# Patient Record
Sex: MALE | Race: WHITE | Employment: FULL TIME | URBAN - METROPOLITAN AREA
[De-identification: names, ages, dates, MRNs, and addresses within clinical notes are randomized per-mention and may not be internally consistent; named-entity substitution may affect disease eponyms.]

---

## 2017-02-21 ENCOUNTER — OFFICE VISIT (OUTPATIENT)
Dept: FAMILY MEDICINE CLINIC | Age: 67
End: 2017-02-21

## 2017-02-21 VITALS
BODY MASS INDEX: 29.51 KG/M2 | WEIGHT: 183.6 LBS | SYSTOLIC BLOOD PRESSURE: 130 MMHG | HEART RATE: 72 BPM | DIASTOLIC BLOOD PRESSURE: 82 MMHG | HEIGHT: 66 IN

## 2017-02-21 DIAGNOSIS — F41.9 ANXIETY: ICD-10-CM

## 2017-02-21 DIAGNOSIS — I10 ESSENTIAL HYPERTENSION: ICD-10-CM

## 2017-02-21 DIAGNOSIS — E11.9 TYPE 2 DIABETES MELLITUS WITHOUT COMPLICATION, UNSPECIFIED LONG TERM INSULIN USE STATUS: Primary | Chronic | ICD-10-CM

## 2017-02-21 DIAGNOSIS — E78.5 HYPERLIPIDEMIA, UNSPECIFIED HYPERLIPIDEMIA TYPE: ICD-10-CM

## 2017-02-21 LAB
CREATININE URINE: 77.8 MG/DL (ref 39–259)
HBA1C MFR BLD: 13.3 %
MICROALBUMIN UR-MCNC: <1.2 MG/DL
MICROALBUMIN/CREAT UR-RTO: NORMAL MG/G (ref 0–30)

## 2017-02-21 PROCEDURE — 83036 HEMOGLOBIN GLYCOSYLATED A1C: CPT | Performed by: FAMILY MEDICINE

## 2017-02-21 PROCEDURE — 99213 OFFICE O/P EST LOW 20 MIN: CPT | Performed by: FAMILY MEDICINE

## 2017-02-21 RX ORDER — ROSUVASTATIN CALCIUM 20 MG/1
20 TABLET, COATED ORAL DAILY
Qty: 30 TABLET | Refills: 2 | Status: SHIPPED | OUTPATIENT
Start: 2017-02-21 | End: 2017-03-14 | Stop reason: SDUPTHER

## 2017-02-21 RX ORDER — CITALOPRAM 40 MG/1
40 TABLET ORAL DAILY
Qty: 30 TABLET | Refills: 0 | Status: SHIPPED | OUTPATIENT
Start: 2017-02-21 | End: 2017-06-08 | Stop reason: SDUPTHER

## 2017-02-21 RX ORDER — LOSARTAN POTASSIUM 25 MG/1
25 TABLET ORAL NIGHTLY
Qty: 30 TABLET | Refills: 2 | Status: SHIPPED | OUTPATIENT
Start: 2017-02-21 | End: 2017-03-14 | Stop reason: SDUPTHER

## 2017-02-21 ASSESSMENT — PATIENT HEALTH QUESTIONNAIRE - PHQ9
1. LITTLE INTEREST OR PLEASURE IN DOING THINGS: 0
2. FEELING DOWN, DEPRESSED OR HOPELESS: 0
SUM OF ALL RESPONSES TO PHQ9 QUESTIONS 1 & 2: 0
SUM OF ALL RESPONSES TO PHQ QUESTIONS 1-9: 0

## 2017-02-28 ENCOUNTER — OFFICE VISIT (OUTPATIENT)
Dept: FAMILY MEDICINE CLINIC | Age: 67
End: 2017-02-28

## 2017-02-28 VITALS
HEIGHT: 66 IN | WEIGHT: 186.4 LBS | DIASTOLIC BLOOD PRESSURE: 64 MMHG | SYSTOLIC BLOOD PRESSURE: 118 MMHG | BODY MASS INDEX: 29.96 KG/M2 | HEART RATE: 74 BPM

## 2017-02-28 DIAGNOSIS — E11.649 HYPOGLYCEMIA DUE TO TYPE 2 DIABETES MELLITUS (HCC): Primary | ICD-10-CM

## 2017-02-28 PROCEDURE — 99213 OFFICE O/P EST LOW 20 MIN: CPT | Performed by: FAMILY MEDICINE

## 2017-03-14 ENCOUNTER — OFFICE VISIT (OUTPATIENT)
Dept: FAMILY MEDICINE CLINIC | Age: 67
End: 2017-03-14

## 2017-03-14 VITALS
HEART RATE: 65 BPM | WEIGHT: 193.4 LBS | DIASTOLIC BLOOD PRESSURE: 62 MMHG | HEIGHT: 66 IN | SYSTOLIC BLOOD PRESSURE: 110 MMHG | BODY MASS INDEX: 31.08 KG/M2

## 2017-03-14 DIAGNOSIS — E78.5 HYPERLIPIDEMIA, UNSPECIFIED HYPERLIPIDEMIA TYPE: ICD-10-CM

## 2017-03-14 DIAGNOSIS — I10 ESSENTIAL HYPERTENSION: ICD-10-CM

## 2017-03-14 DIAGNOSIS — E11.9 TYPE 2 DIABETES MELLITUS WITHOUT COMPLICATION, UNSPECIFIED LONG TERM INSULIN USE STATUS: Chronic | ICD-10-CM

## 2017-03-14 PROCEDURE — 99213 OFFICE O/P EST LOW 20 MIN: CPT | Performed by: FAMILY MEDICINE

## 2017-03-14 RX ORDER — ROSUVASTATIN CALCIUM 20 MG/1
20 TABLET, COATED ORAL DAILY
Qty: 30 TABLET | Refills: 2 | Status: SHIPPED | OUTPATIENT
Start: 2017-03-14 | End: 2017-06-14 | Stop reason: SDUPTHER

## 2017-03-14 RX ORDER — LOSARTAN POTASSIUM 25 MG/1
25 TABLET ORAL NIGHTLY
Qty: 30 TABLET | Refills: 2 | Status: SHIPPED | OUTPATIENT
Start: 2017-03-14 | End: 2017-06-14 | Stop reason: SDUPTHER

## 2017-03-22 ENCOUNTER — TELEPHONE (OUTPATIENT)
Dept: FAMILY MEDICINE CLINIC | Age: 67
End: 2017-03-22

## 2017-06-08 ENCOUNTER — TELEPHONE (OUTPATIENT)
Dept: FAMILY MEDICINE CLINIC | Age: 67
End: 2017-06-08

## 2017-06-08 DIAGNOSIS — F41.9 ANXIETY: ICD-10-CM

## 2017-06-08 RX ORDER — CITALOPRAM 40 MG/1
40 TABLET ORAL DAILY
Qty: 30 TABLET | Refills: 0 | Status: SHIPPED | OUTPATIENT
Start: 2017-06-08 | End: 2017-06-14 | Stop reason: SDUPTHER

## 2017-06-14 ENCOUNTER — OFFICE VISIT (OUTPATIENT)
Dept: FAMILY MEDICINE CLINIC | Age: 67
End: 2017-06-14

## 2017-06-14 VITALS
SYSTOLIC BLOOD PRESSURE: 102 MMHG | BODY MASS INDEX: 31.86 KG/M2 | HEART RATE: 68 BPM | DIASTOLIC BLOOD PRESSURE: 64 MMHG | HEIGHT: 67 IN | WEIGHT: 203 LBS

## 2017-06-14 DIAGNOSIS — E78.5 HYPERLIPIDEMIA, UNSPECIFIED HYPERLIPIDEMIA TYPE: ICD-10-CM

## 2017-06-14 DIAGNOSIS — E11.9 TYPE 2 DIABETES MELLITUS WITHOUT COMPLICATION, UNSPECIFIED LONG TERM INSULIN USE STATUS: Primary | Chronic | ICD-10-CM

## 2017-06-14 DIAGNOSIS — F41.9 ANXIETY: ICD-10-CM

## 2017-06-14 DIAGNOSIS — I10 ESSENTIAL HYPERTENSION: ICD-10-CM

## 2017-06-14 LAB — HBA1C MFR BLD: 9 %

## 2017-06-14 PROCEDURE — 83036 HEMOGLOBIN GLYCOSYLATED A1C: CPT | Performed by: FAMILY MEDICINE

## 2017-06-14 PROCEDURE — 99213 OFFICE O/P EST LOW 20 MIN: CPT | Performed by: FAMILY MEDICINE

## 2017-06-14 RX ORDER — ROSUVASTATIN CALCIUM 20 MG/1
20 TABLET, COATED ORAL DAILY
Qty: 30 TABLET | Refills: 2 | Status: SHIPPED | OUTPATIENT
Start: 2017-06-14 | End: 2017-09-21 | Stop reason: SDUPTHER

## 2017-06-14 RX ORDER — CITALOPRAM 40 MG/1
40 TABLET ORAL DAILY
Qty: 30 TABLET | Refills: 2 | Status: SHIPPED | OUTPATIENT
Start: 2017-06-14 | End: 2017-09-21 | Stop reason: SDUPTHER

## 2017-06-14 RX ORDER — LOSARTAN POTASSIUM 25 MG/1
25 TABLET ORAL NIGHTLY
Qty: 30 TABLET | Refills: 2 | Status: SHIPPED | OUTPATIENT
Start: 2017-06-14 | End: 2017-09-21 | Stop reason: SDUPTHER

## 2017-07-03 ENCOUNTER — TELEPHONE (OUTPATIENT)
Dept: FAMILY MEDICINE CLINIC | Age: 67
End: 2017-07-03

## 2017-09-21 ENCOUNTER — OFFICE VISIT (OUTPATIENT)
Dept: FAMILY MEDICINE CLINIC | Age: 67
End: 2017-09-21

## 2017-09-21 VITALS
WEIGHT: 207.2 LBS | HEART RATE: 65 BPM | SYSTOLIC BLOOD PRESSURE: 126 MMHG | DIASTOLIC BLOOD PRESSURE: 70 MMHG | BODY MASS INDEX: 32.45 KG/M2

## 2017-09-21 DIAGNOSIS — E11.9 TYPE 2 DIABETES MELLITUS WITHOUT COMPLICATION, UNSPECIFIED LONG TERM INSULIN USE STATUS: Primary | Chronic | ICD-10-CM

## 2017-09-21 DIAGNOSIS — Z11.59 NEED FOR HEPATITIS C SCREENING TEST: ICD-10-CM

## 2017-09-21 DIAGNOSIS — E78.5 HYPERLIPIDEMIA, UNSPECIFIED HYPERLIPIDEMIA TYPE: ICD-10-CM

## 2017-09-21 DIAGNOSIS — F41.9 ANXIETY: ICD-10-CM

## 2017-09-21 DIAGNOSIS — Z23 NEED FOR INFLUENZA VACCINATION: ICD-10-CM

## 2017-09-21 DIAGNOSIS — I25.10 CORONARY ARTERY DISEASE INVOLVING NATIVE CORONARY ARTERY OF NATIVE HEART WITHOUT ANGINA PECTORIS: ICD-10-CM

## 2017-09-21 DIAGNOSIS — I10 ESSENTIAL HYPERTENSION: Chronic | ICD-10-CM

## 2017-09-21 DIAGNOSIS — N52.9 ERECTILE DYSFUNCTION, UNSPECIFIED ERECTILE DYSFUNCTION TYPE: ICD-10-CM

## 2017-09-21 DIAGNOSIS — I25.2 MI, OLD: Chronic | ICD-10-CM

## 2017-09-21 DIAGNOSIS — K21.9 GASTROESOPHAGEAL REFLUX DISEASE WITHOUT ESOPHAGITIS: ICD-10-CM

## 2017-09-21 LAB
A/G RATIO: 1.2 (ref 1.1–2.2)
ALBUMIN SERPL-MCNC: 4.1 G/DL (ref 3.4–5)
ALP BLD-CCNC: 74 U/L (ref 40–129)
ALT SERPL-CCNC: 12 U/L (ref 10–40)
ANION GAP SERPL CALCULATED.3IONS-SCNC: 19 MMOL/L (ref 3–16)
AST SERPL-CCNC: 15 U/L (ref 15–37)
BILIRUB SERPL-MCNC: 0.3 MG/DL (ref 0–1)
BUN BLDV-MCNC: 20 MG/DL (ref 7–20)
CALCIUM SERPL-MCNC: 9.4 MG/DL (ref 8.3–10.6)
CHLORIDE BLD-SCNC: 99 MMOL/L (ref 99–110)
CHOLESTEROL, TOTAL: 187 MG/DL (ref 0–199)
CO2: 25 MMOL/L (ref 21–32)
CREAT SERPL-MCNC: 0.8 MG/DL (ref 0.8–1.3)
GFR AFRICAN AMERICAN: >60
GFR NON-AFRICAN AMERICAN: >60
GLOBULIN: 3.3 G/DL
GLUCOSE BLD-MCNC: 53 MG/DL (ref 70–99)
HBA1C MFR BLD: 8.4 %
HDLC SERPL-MCNC: 67 MG/DL (ref 40–60)
HEPATITIS C ANTIBODY INTERPRETATION: NORMAL
LDL CHOLESTEROL CALCULATED: 105 MG/DL
POTASSIUM SERPL-SCNC: 4.1 MMOL/L (ref 3.5–5.1)
SODIUM BLD-SCNC: 143 MMOL/L (ref 136–145)
TOTAL PROTEIN: 7.4 G/DL (ref 6.4–8.2)
TRIGL SERPL-MCNC: 75 MG/DL (ref 0–150)
VLDLC SERPL CALC-MCNC: 15 MG/DL

## 2017-09-21 PROCEDURE — 36415 COLL VENOUS BLD VENIPUNCTURE: CPT | Performed by: FAMILY MEDICINE

## 2017-09-21 PROCEDURE — 90471 IMMUNIZATION ADMIN: CPT | Performed by: FAMILY MEDICINE

## 2017-09-21 PROCEDURE — 90688 IIV4 VACCINE SPLT 0.5 ML IM: CPT | Performed by: FAMILY MEDICINE

## 2017-09-21 PROCEDURE — 99214 OFFICE O/P EST MOD 30 MIN: CPT | Performed by: FAMILY MEDICINE

## 2017-09-21 PROCEDURE — 83036 HEMOGLOBIN GLYCOSYLATED A1C: CPT | Performed by: FAMILY MEDICINE

## 2017-09-21 RX ORDER — SILDENAFIL 100 MG/1
100 TABLET, FILM COATED ORAL PRN
Qty: 15 TABLET | Refills: 11 | Status: SHIPPED | OUTPATIENT
Start: 2017-09-21 | End: 2018-09-10

## 2017-09-21 RX ORDER — LOSARTAN POTASSIUM 25 MG/1
25 TABLET ORAL NIGHTLY
Qty: 30 TABLET | Refills: 2 | Status: SHIPPED | OUTPATIENT
Start: 2017-09-21 | End: 2018-01-02 | Stop reason: SDUPTHER

## 2017-09-21 RX ORDER — CITALOPRAM 40 MG/1
40 TABLET ORAL DAILY
Qty: 30 TABLET | Refills: 2 | Status: SHIPPED | OUTPATIENT
Start: 2017-09-21 | End: 2018-01-02 | Stop reason: SDUPTHER

## 2017-09-21 RX ORDER — ESOMEPRAZOLE MAGNESIUM 40 MG/1
40 CAPSULE, DELAYED RELEASE ORAL DAILY
Qty: 30 CAPSULE | Refills: 11 | Status: SHIPPED | OUTPATIENT
Start: 2017-09-21 | End: 2018-05-07 | Stop reason: SDUPTHER

## 2017-09-21 RX ORDER — ROSUVASTATIN CALCIUM 20 MG/1
20 TABLET, COATED ORAL DAILY
Qty: 30 TABLET | Refills: 2 | Status: SHIPPED | OUTPATIENT
Start: 2017-09-21 | End: 2018-01-02 | Stop reason: SDUPTHER

## 2017-09-22 ENCOUNTER — TELEPHONE (OUTPATIENT)
Dept: CARDIOLOGY CLINIC | Age: 67
End: 2017-09-22

## 2017-09-22 ASSESSMENT — ENCOUNTER SYMPTOMS
CHEST TIGHTNESS: 0
SHORTNESS OF BREATH: 0

## 2017-10-13 ENCOUNTER — TELEPHONE (OUTPATIENT)
Dept: CARDIOLOGY CLINIC | Age: 67
End: 2017-10-13

## 2017-10-26 ENCOUNTER — TELEPHONE (OUTPATIENT)
Dept: CARDIOLOGY CLINIC | Age: 67
End: 2017-10-26

## 2017-10-31 ENCOUNTER — INITIAL CONSULT (OUTPATIENT)
Dept: CARDIOLOGY CLINIC | Age: 67
End: 2017-10-31

## 2017-10-31 VITALS
HEIGHT: 67 IN | BODY MASS INDEX: 32.8 KG/M2 | HEART RATE: 59 BPM | WEIGHT: 209 LBS | SYSTOLIC BLOOD PRESSURE: 118 MMHG | DIASTOLIC BLOOD PRESSURE: 62 MMHG

## 2017-10-31 DIAGNOSIS — E11.9 TYPE 2 DIABETES MELLITUS WITHOUT COMPLICATION, UNSPECIFIED LONG TERM INSULIN USE STATUS: Chronic | ICD-10-CM

## 2017-10-31 DIAGNOSIS — I20.9 ANGINA, CLASS II (HCC): Chronic | ICD-10-CM

## 2017-10-31 DIAGNOSIS — Z98.61 HISTORY OF PTCA: Chronic | ICD-10-CM

## 2017-10-31 DIAGNOSIS — Z76.89 ESTABLISHING CARE WITH NEW DOCTOR, ENCOUNTER FOR: Primary | ICD-10-CM

## 2017-10-31 DIAGNOSIS — I10 ESSENTIAL HYPERTENSION: Chronic | ICD-10-CM

## 2017-10-31 DIAGNOSIS — E78.2 HYPERLIPIDEMIA, MIXED: Chronic | ICD-10-CM

## 2017-10-31 PROCEDURE — 4040F PNEUMOC VAC/ADMIN/RCVD: CPT | Performed by: INTERNAL MEDICINE

## 2017-10-31 PROCEDURE — G8484 FLU IMMUNIZE NO ADMIN: HCPCS | Performed by: INTERNAL MEDICINE

## 2017-10-31 PROCEDURE — 3017F COLORECTAL CA SCREEN DOC REV: CPT | Performed by: INTERNAL MEDICINE

## 2017-10-31 PROCEDURE — G8427 DOCREV CUR MEDS BY ELIG CLIN: HCPCS | Performed by: INTERNAL MEDICINE

## 2017-10-31 PROCEDURE — 93000 ELECTROCARDIOGRAM COMPLETE: CPT | Performed by: INTERNAL MEDICINE

## 2017-10-31 PROCEDURE — G8417 CALC BMI ABV UP PARAM F/U: HCPCS | Performed by: INTERNAL MEDICINE

## 2017-10-31 PROCEDURE — 99204 OFFICE O/P NEW MOD 45 MIN: CPT | Performed by: INTERNAL MEDICINE

## 2017-10-31 NOTE — ASSESSMENT & PLAN NOTE
He has no exertional angina. He is advised to start aspirin 81 mg daily. He is currently not on any antianginal medication. He is asking about possibly using Viagra. There is no contraindication to him using Viagra. WE had  extensive discussion with him and explained that we do not do regular screening stress test.  He has had stresses every 6 months with Dr. Edwar Pierre. I've expanded to him that there is no indication for stress test unless he has symptoms.   We will continue medical management

## 2017-10-31 NOTE — PROGRESS NOTES
CARDIOLOGY  NOTE    Chief Complaint: Establish care    HPI:   Héctor Esquivel is a 74y.o. year old who has history as noted below. He is seen Dr. Sebastian Lamas. He had cardiac cath and stent to the proximal LAD in 2002. He is had multiple stress tests since then. He denies any chest pain or shortness of breath. He works at night at [employer]. Managing his insulin with work has been a struggle. When he had a stent in 2002. He did not have any chest pain or cardiac event. He tells me that he had a stress test at that time. After which he ended up getting a stent in the proximal LAD. Cardiac review his cardiac catheter. Psych he has a type I LAD with small vessel. He has no exertional symptoms. He's mopping floors washing dishes at work but gets no chest pain. He is here with his wife, who  is an RN    Current Outpatient Prescriptions   Medication Sig Dispense Refill    Omega 3-6-9 Fatty Acids (Jagjit Ranks 3-6-9 COMPLEX PO) Take by mouth      losartan (COZAAR) 25 MG tablet Take 1 tablet by mouth nightly 30 tablet 2    citalopram (CELEXA) 40 MG tablet Take 1 tablet by mouth daily 30 tablet 2    rosuvastatin (CRESTOR) 20 MG tablet Take 1 tablet by mouth daily 30 tablet 2    esomeprazole (NEXIUM) 40 MG delayed release capsule Take 1 capsule by mouth daily 30 capsule 11    sildenafil (VIAGRA) 100 MG tablet Take 1 tablet by mouth as needed for Erectile Dysfunction 15 tablet 11    insulin 70-30 (NOVOLIN 70/30) (70-30) 100 UNIT per ML injection vial 35 units in am and 25 units in evening 1 vial 11    glucose ([employer] GLUCOSE) 4 G chewable tablet Take 4 tablets by mouth as needed for Low blood sugar (Patient taking differently: Take 4 tablets by mouth as needed for Low blood sugar) 60 tablet 0    Insulin Syringe-Needle U-100 ([employer] INSULIN SYR 1CC/30G) 30G X 5/16\" 1 ML MISC 1 each by Does not apply route 2 times daily 100 each 11    Cinnamon 500 MG CAPS Take 500 mg by mouth 2 times daily.      Omega-3 Fatty Acids (OMEGA 3 PO) Take 1 tablet by mouth.  Multiple Vitamins-Minerals (MULTIVITAMIN PO) Take 1 tablet by mouth.  aspirin 81 MG tablet Take 81 mg by mouth daily. No current facility-administered medications for this visit. Allergies:   Cymbalta [duloxetine hcl]    Patient History:  Past Medical History:   Diagnosis Date    Arthritis     CAD (coronary artery disease)     Diabetes mellitus ([address]     Hyperlipidemia     Hypertension     MDRO (multiple drug resistant organisms) resistance     MRSA in Rt elbow 11/13     Past Surgical History:   Procedure Laterality Date    ADENOIDECTOMY      CARDIAC CATHETERIZATION      Heart cath with stent placed in back side of heart    COLONOSCOPY  02/25/2014    TOTAL KNEE ARTHROPLASTY Right 12/2015    Right knee replaced    TOTAL KNEE ARTHROPLASTY Left 07/2014    Left knee replaced    VEIN SURGERY Right 2015    Vein stripped in right leg     Family History   Problem Relation Age of Onset    Osteoarthritis Mother     Rheumatologic Disease Mother      Raynaud's/ Crest     Heart Disease Father     Asthma Father     Diabetes Father     Heart Failure Father     Seizures Brother     Cancer Maternal Aunt     Stroke Maternal Uncle     Diabetes Paternal Aunt     High Cholesterol Paternal Aunt     Rashes/Skin Problems Maternal Grandmother     Stroke Maternal Grandmother     Diabetes Paternal Grandmother     Heart Failure Paternal Grandfather      Social History   Substance Use Topics    Smoking status: Never Smoker    Smokeless tobacco: Never Used    Alcohol use No        Review of Systems:   · Constitutional: No Fever or Weight Loss   · Eyes: No Decreased Vision  · ENT: No Headaches, Hearing Loss or Vertigo  · Cardiovascular: as per note above   · Respiratory: No cough or wheezing and as per note above.    · Gastrointestinal: No abdominal pain, appetite loss, blood in stools, constipation, diarrhea or heartburn  · Genitourinary: No dysuria, trouble voiding, or hematuria  · Musculoskeletal:  None  · Integumentary: No rash or pruritis  · Neurological: No TIA or stroke symptoms  · Psychiatric: No anxiety or depression  · Endocrine: No malaise, fatigue or temperature intolerance  · Hematologic/Lymphatic: No bleeding problems, blood clots or swollen lymph nodes  · Allergic/Immunologic: No nasal congestion or hives    Objective:      Physical Exam:  /62   Pulse 59   Ht 5' 7\" (1.702 m)   Wt 209 lb (94.8 kg)   BMI 32.73 kg/m²   Wt Readings from Last 3 Encounters:   10/31/17 209 lb (94.8 kg)   09/21/17 207 lb 3.2 oz (94 kg)   06/14/17 203 lb (92.1 kg)     Body mass index is 32.73 kg/m². Vitals:    10/31/17 1521   BP: 118/62   Pulse: 59        General Appearance:  No distress, conversant  Constitutional:  Well developed, Well nourished, No acute distress, Non-toxic appearance. HENT:  Normocephalic, Atraumatic, Bilateral external ears normal, Oropharynx moist, No oral exudates, Nose normal. Neck- Normal range of motion, No tenderness, Supple, No stridor,no apical-carotid delay  Eyes:  PERRL, EOMI, Conjunctiva normal, No discharge. Respiratory:  Normal breath sounds, No respiratory distress, No wheezing, No chest tenderness. ,no use of accessory muscles,   Cardiovascular: (PMI) apex non displaced,no lifts no thrills,S1 and S2 audible, No added heart sounds, No signs of ankle edema, or volume overload, No evidence of JVD  GI:  Bowel sounds normal, Soft, No tenderness, No masses, No gross visceromegaly   :  No costovertebral angle tenderness   Musculoskeletal:  No edema, no tenderness, no deformities.  Back- no tenderness  Integument:  Well hydrated, no rash   Lymphatic:  No lymphadenopathy noted   Neurologic:  Alert & oriented x 3, CN 2-12 normal, normal motor function, normal sensory function, no focal deficits noted   Psychiatric:  Speech and behavior appropriate       Medical decision making and Data review:  DATA:  No results found for: TROPONINT  BNP:  No results found for: PROBNP  PT/INR:  No results found for: PTINR  Lab Results   Component Value Date    LABA1C 8.4 09/21/2017    LABA1C 9.0 06/14/2017     Lab Results   Component Value Date    CHOL 187 09/21/2017    TRIG 75 09/21/2017    HDL 67 (H) 09/21/2017    LDLCALC 105 (H) 09/21/2017     Lab Results   Component Value Date    ALT 12 09/21/2017    AST 15 09/21/2017     TSH: No results found for: TSH      All labs, medications and tests reviewed by myself including data and history from outside source , patient and available family . Assessment & Plan:      1. Establishing care with new doctor, encounter for    2. Angina, class II ([facility]    3. Type 2 diabetes mellitus without complication, unspecified long term insulin use status ([facility]    4. Hyperlipidemia, mixed    5. Essential hypertension    6. History of PTCA         Angina, class II ([facility]  He has no exertional angina. He is advised to start aspirin 81 mg daily. He is currently not on any antianginal medication. He is asking about possibly using Viagra. There is no contraindication to him using Viagra. WE had  extensive discussion with him and explained that we do not do regular screening stress test.  He has had stresses every 6 months with Dr. Jennifer Spencer. I've expanded to him that there is no indication for stress test unless he has symptoms. We will continue medical management    Type 2 diabetes mellitus without complication (HCC)  Last HbA1c was 8.9 our goal is to around 7    Essential hypertension  Blood pressures fairly well-controlled    History of PTCA  Proximal LAD stent in 2002 by Dr. Jennifer Spencer. At that time. Indication was abnormal.  Stresses. He did not have any symptoms. He has had some chest pain since then which was improved after he was put on antidepressants.   Continue aspirin and Crestor     Dyslipidemia :  Nagaleah Barnettgi had lab work recently,  Lipid profile was reviewed with patient. Continue Crestor    Counseled extensively and medication compliance urged. We discussed that for the  prevention of ASCVD our  goal is aggressive risk modification. Patient is encouraged to exercise even a brisk walk for 30 minutes  at least 3 to 4 times a week   Various goals were discussed and questions answered. Continue current medications. Appropriate prescriptions are addressed and refills ordered. Questions answered and patient verbalizes understanding. Call for any problems, questions, or concerns. Continue all other medications of all above medical condition listed as is. Return in about 6 months (around 4/30/2018).

## 2018-01-02 ENCOUNTER — OFFICE VISIT (OUTPATIENT)
Dept: FAMILY MEDICINE CLINIC | Age: 68
End: 2018-01-02

## 2018-01-02 VITALS
WEIGHT: 209.8 LBS | HEIGHT: 67 IN | BODY MASS INDEX: 32.93 KG/M2 | SYSTOLIC BLOOD PRESSURE: 120 MMHG | DIASTOLIC BLOOD PRESSURE: 70 MMHG | HEART RATE: 62 BPM

## 2018-01-02 DIAGNOSIS — Z23 NEED FOR PNEUMOCOCCAL VACCINE: ICD-10-CM

## 2018-01-02 DIAGNOSIS — E11.9 TYPE 2 DIABETES MELLITUS WITHOUT COMPLICATION, UNSPECIFIED LONG TERM INSULIN USE STATUS: Primary | Chronic | ICD-10-CM

## 2018-01-02 DIAGNOSIS — I10 ESSENTIAL HYPERTENSION: Chronic | ICD-10-CM

## 2018-01-02 DIAGNOSIS — E78.5 HYPERLIPIDEMIA, UNSPECIFIED HYPERLIPIDEMIA TYPE: ICD-10-CM

## 2018-01-02 DIAGNOSIS — Z23 NEED FOR SHINGLES VACCINE: ICD-10-CM

## 2018-01-02 DIAGNOSIS — Z23 NEED FOR TETANUS BOOSTER: ICD-10-CM

## 2018-01-02 DIAGNOSIS — F41.9 ANXIETY: ICD-10-CM

## 2018-01-02 LAB — HBA1C MFR BLD: 8.6 %

## 2018-01-02 PROCEDURE — 90471 IMMUNIZATION ADMIN: CPT | Performed by: FAMILY MEDICINE

## 2018-01-02 PROCEDURE — G0009 ADMIN PNEUMOCOCCAL VACCINE: HCPCS | Performed by: FAMILY MEDICINE

## 2018-01-02 PROCEDURE — 83036 HEMOGLOBIN GLYCOSYLATED A1C: CPT | Performed by: FAMILY MEDICINE

## 2018-01-02 PROCEDURE — 99213 OFFICE O/P EST LOW 20 MIN: CPT | Performed by: FAMILY MEDICINE

## 2018-01-02 PROCEDURE — 90670 PCV13 VACCINE IM: CPT | Performed by: FAMILY MEDICINE

## 2018-01-02 PROCEDURE — 90715 TDAP VACCINE 7 YRS/> IM: CPT | Performed by: FAMILY MEDICINE

## 2018-01-02 RX ORDER — ROSUVASTATIN CALCIUM 20 MG/1
20 TABLET, COATED ORAL DAILY
Qty: 30 TABLET | Refills: 2 | Status: SHIPPED | OUTPATIENT
Start: 2018-01-02 | End: 2018-03-26 | Stop reason: SDUPTHER

## 2018-01-02 RX ORDER — LOSARTAN POTASSIUM 25 MG/1
25 TABLET ORAL NIGHTLY
Qty: 30 TABLET | Refills: 2 | Status: SHIPPED | OUTPATIENT
Start: 2018-01-02 | End: 2018-03-26 | Stop reason: SDUPTHER

## 2018-01-02 RX ORDER — CITALOPRAM 40 MG/1
40 TABLET ORAL DAILY
Qty: 30 TABLET | Refills: 2 | Status: SHIPPED | OUTPATIENT
Start: 2018-01-02 | End: 2018-03-26 | Stop reason: SDUPTHER

## 2018-01-02 ASSESSMENT — ENCOUNTER SYMPTOMS
SHORTNESS OF BREATH: 0
CHEST TIGHTNESS: 0

## 2018-01-02 NOTE — PROGRESS NOTES
Patient ID: Marlys Carrillo 1950      Diabetes   He presents for his initial diabetic visit. He has type 2 diabetes mellitus. His disease course has been fluctuating. (Much less often than last time) Pertinent negatives for diabetes include no chest pain. Diabetic complications include heart disease. Current diabetic treatment includes insulin injections. He is compliant with treatment some of the time (sugars go up when he eats poorly). His weight is stable. He is following a generally unhealthy diet. When asked about meal planning, he reported none. He has not had a previous visit with a dietitian. His home blood glucose trend is fluctuating dramatically. His overall blood glucose range is 130-140 mg/dl. An ACE inhibitor/angiotensin II receptor blocker is being taken. Hypertension   Pertinent negatives include no chest pain, palpitations or shortness of breath. Mental Health Problem   Primary symptoms comment: anxiety. worsened with the recent death of his mother. This is a chronic problem. The onset of the illness is precipitated by emotional stress and a stressful event. Sequelae of the illness include harmed interpersonal relations. Review of Systems   Respiratory: Negative for chest tightness and shortness of breath. Cardiovascular: Negative for chest pain, palpitations and leg swelling.        Patient Active Problem List   Diagnosis    Angina, class II ([address]    Abnormal nuclear cardiac imaging test    History of PTCA    Type 2 diabetes mellitus without complication ([address]    Essential hypertension    Hyperlipidemia, mixed    Gastroesophageal reflux disease without esophagitis       Past Medical History:   Diagnosis Date    Arthritis     CAD (coronary artery disease)     Diabetes mellitus ([address]     Hyperlipidemia     Hypertension     MDRO (multiple drug resistant organisms) resistance     MRSA in Rt elbow 11/13       Past Surgical History:   Procedure Laterality Date    Globulin 09/21/2017 3.3     Hep C Ab Interp 09/21/2017 Non-reactive            Assessment:      1. Type 2 diabetes mellitus without complication, unspecified long term insulin use status (HCC)  POCT glycosylated hemoglobin (Hb A1C)    insulin 70-30 (NOVOLIN 70/30) (70-30) 100 UNIT per ML injection vial    Insulin Syringe-Needle U-100 ([employer] INSULIN SYR 1CC/30G) 30G X 5/16\" 1 ML MISC   2. Essential hypertension  losartan (COZAAR) 25 MG tablet   3. Anxiety  citalopram (CELEXA) 40 MG tablet   4. Hyperlipidemia, unspecified hyperlipidemia type  rosuvastatin (CRESTOR) 20 MG tablet   5. Need for pneumococcal vaccine  Pneumococcal conjugate vaccine 13-valent IM (PREVNAR 13)   6. Need for shingles vaccine  zoster vaccine live, PF, (ZOSTAVAX) 11961 UNT/0.65ML injection   7. Need for tetanus booster  Tdap (age 6y and older) IM (Boostrix)           Plan:      Encourage patient to walk 30 minutes briskly 3-4 times a week. This will help to reduce his need for insulin. I'm unable to adjust his insulin because he did not bring his blood sugars in today.     See orders    Hypertension stable

## 2018-03-26 ENCOUNTER — OFFICE VISIT (OUTPATIENT)
Dept: FAMILY MEDICINE CLINIC | Age: 68
End: 2018-03-26

## 2018-03-26 VITALS
DIASTOLIC BLOOD PRESSURE: 60 MMHG | SYSTOLIC BLOOD PRESSURE: 110 MMHG | HEART RATE: 60 BPM | BODY MASS INDEX: 33.02 KG/M2 | HEIGHT: 67 IN | WEIGHT: 210.4 LBS

## 2018-03-26 DIAGNOSIS — F41.9 ANXIETY: ICD-10-CM

## 2018-03-26 DIAGNOSIS — K21.9 GASTROESOPHAGEAL REFLUX DISEASE WITHOUT ESOPHAGITIS: Primary | ICD-10-CM

## 2018-03-26 DIAGNOSIS — E11.42 DM TYPE 2 WITH DIABETIC PERIPHERAL NEUROPATHY (HCC): ICD-10-CM

## 2018-03-26 DIAGNOSIS — E78.5 HYPERLIPIDEMIA, UNSPECIFIED HYPERLIPIDEMIA TYPE: ICD-10-CM

## 2018-03-26 DIAGNOSIS — M20.42 HAMMER TOES OF BOTH FEET: ICD-10-CM

## 2018-03-26 DIAGNOSIS — I10 ESSENTIAL HYPERTENSION: Chronic | ICD-10-CM

## 2018-03-26 DIAGNOSIS — M20.41 HAMMER TOES OF BOTH FEET: ICD-10-CM

## 2018-03-26 DIAGNOSIS — Z23 NEED FOR SHINGLES VACCINE: ICD-10-CM

## 2018-03-26 LAB
CREATININE URINE: 24 MG/DL (ref 39–259)
HBA1C MFR BLD: 8.6 %
MICROALBUMIN UR-MCNC: <1.2 MG/DL
MICROALBUMIN/CREAT UR-RTO: ABNORMAL MG/G (ref 0–30)

## 2018-03-26 PROCEDURE — 3045F PR MOST RECENT HEMOGLOBIN A1C LEVEL 7.0-9.0%: CPT | Performed by: FAMILY MEDICINE

## 2018-03-26 PROCEDURE — G8598 ASA/ANTIPLAT THER USED: HCPCS | Performed by: FAMILY MEDICINE

## 2018-03-26 PROCEDURE — 4040F PNEUMOC VAC/ADMIN/RCVD: CPT | Performed by: FAMILY MEDICINE

## 2018-03-26 PROCEDURE — 1123F ACP DISCUSS/DSCN MKR DOCD: CPT | Performed by: FAMILY MEDICINE

## 2018-03-26 PROCEDURE — 99214 OFFICE O/P EST MOD 30 MIN: CPT | Performed by: FAMILY MEDICINE

## 2018-03-26 PROCEDURE — 1036F TOBACCO NON-USER: CPT | Performed by: FAMILY MEDICINE

## 2018-03-26 PROCEDURE — G8417 CALC BMI ABV UP PARAM F/U: HCPCS | Performed by: FAMILY MEDICINE

## 2018-03-26 PROCEDURE — G8482 FLU IMMUNIZE ORDER/ADMIN: HCPCS | Performed by: FAMILY MEDICINE

## 2018-03-26 PROCEDURE — G8427 DOCREV CUR MEDS BY ELIG CLIN: HCPCS | Performed by: FAMILY MEDICINE

## 2018-03-26 PROCEDURE — 3017F COLORECTAL CA SCREEN DOC REV: CPT | Performed by: FAMILY MEDICINE

## 2018-03-26 PROCEDURE — 83036 HEMOGLOBIN GLYCOSYLATED A1C: CPT | Performed by: FAMILY MEDICINE

## 2018-03-26 RX ORDER — ESOMEPRAZOLE MAGNESIUM 20 MG/1
20 FOR SUSPENSION ORAL DAILY
COMMUNITY
End: 2019-10-09

## 2018-03-26 RX ORDER — LOSARTAN POTASSIUM 25 MG/1
25 TABLET ORAL NIGHTLY
Qty: 30 TABLET | Refills: 2 | Status: SHIPPED | OUTPATIENT
Start: 2018-03-26 | End: 2018-06-18 | Stop reason: SDUPTHER

## 2018-03-26 RX ORDER — CITALOPRAM 40 MG/1
40 TABLET ORAL DAILY
Qty: 30 TABLET | Refills: 11 | Status: SHIPPED | OUTPATIENT
Start: 2018-03-26 | End: 2019-04-22 | Stop reason: SDUPTHER

## 2018-03-26 RX ORDER — RANITIDINE 150 MG/1
150 TABLET ORAL 2 TIMES DAILY
Qty: 60 TABLET | Refills: 11 | Status: SHIPPED | OUTPATIENT
Start: 2018-03-26 | End: 2018-05-07 | Stop reason: SINTOL

## 2018-03-26 RX ORDER — ROSUVASTATIN CALCIUM 20 MG/1
20 TABLET, COATED ORAL DAILY
Qty: 30 TABLET | Refills: 2 | Status: SHIPPED | OUTPATIENT
Start: 2018-03-26 | End: 2018-06-18 | Stop reason: SDUPTHER

## 2018-03-26 ASSESSMENT — ENCOUNTER SYMPTOMS
CHEST TIGHTNESS: 0
SHORTNESS OF BREATH: 0

## 2018-03-26 NOTE — PROGRESS NOTES
BP Readings from Last 2 Encounters:   03/26/18 110/60   01/02/18 120/70     Hemoglobin A1C (%)   Date Value   01/02/2018 8.6     Microalbumin, Random Urine (mg/dL)   Date Value   02/21/2017 <1.20     LDL Calculated (mg/dL)   Date Value   09/21/2017 105 (H)              Tobacco use:  Patient  reports that he has never smoked. He has never used smokeless tobacco.    If Smoker - Cessation materials given? NA    Is Daily aspirin on medication list?:  Yes    Diabetic retinal exam done? No   If yes, document in Health Maintenance. Monofilament placed on counter? Yes    Shoes and socks removed? Yes    BP taken with correct size cuff? Yes   Repeated if > 140/90 NA     Is patient taking any medications for diabetes    Yes   If yes, see medication list    Is the patient reporting any side effects of diabetic medications? No    Microalbumin performed if applicable?   Yes      Is patient taking any over the counter medications    Yes   If yes, see medication list

## 2018-03-26 NOTE — PROGRESS NOTES
Patient ID: Jody Butts 1950      Diabetes   He presents for his initial diabetic visit. He has type 2 diabetes mellitus. His disease course has been fluctuating. (Much less often than last time) Pertinent negatives for diabetes include no chest pain. Diabetic complications include heart disease. Current diabetic treatment includes insulin injections. He is compliant with treatment some of the time (sugars go up when he eats poorly). His weight is stable. He is following a generally unhealthy diet. When asked about meal planning, he reported none. He has not had a previous visit with a dietitian. His home blood glucose trend is fluctuating dramatically. (Declines insulin adjustment today) An ACE inhibitor/angiotensin II receptor blocker is being taken. Hypertension   This is a chronic problem. Pertinent negatives include no chest pain, palpitations or shortness of breath. Past treatments include angiotensin blockers. The current treatment provides significant improvement. Mental Health Problem   Primary symptoms comment: stable on the Celexa for now. This is a chronic problem. The onset of the illness is precipitated by emotional stress. Arm Pain    There was no injury mechanism. The pain is present in the upper right arm. Quality: works at [employer] and does a lot of work with the right hand. Pertinent negatives include no chest pain. Review of Systems   Constitutional: Negative. Respiratory: Negative for chest tightness and shortness of breath. Cardiovascular: Negative for chest pain, palpitations and leg swelling.        Patient Active Problem List   Diagnosis    Angina, class II [facility]    Abnormal nuclear cardiac imaging test    History of PTCA    Essential hypertension    Hyperlipidemia, mixed    Gastroesophageal reflux disease without esophagitis       Past Medical History:   Diagnosis Date    Arthritis     CAD (coronary artery disease)     Diabetes mellitus ([address]     Hyperlipidemia     Hypertension     MDRO (multiple drug resistant organisms) resistance     MRSA in Rt elbow 11/13       Past Surgical History:   Procedure Laterality Date    ADENOIDECTOMY      CARDIAC CATHETERIZATION      Heart cath with stent placed in back side of heart    COLONOSCOPY  02/25/2014    TOTAL KNEE ARTHROPLASTY Right 12/2015    Right knee replaced    TOTAL KNEE ARTHROPLASTY Left 07/2014    Left knee replaced    VEIN SURGERY Right 2015    Vein stripped in right leg       Family History   Problem Relation Age of Onset    Osteoarthritis Mother     Rheumatologic Disease Mother      Raynaud's/ Crest     Heart Disease Father     Asthma Father     Diabetes Father     Heart Failure Father     Seizures Brother     Cancer Maternal Aunt     Stroke Maternal Uncle     Diabetes Paternal Aunt     High Cholesterol Paternal Aunt     Rashes/Skin Problems Maternal Grandmother     Stroke Maternal Grandmother     Diabetes Paternal Grandmother     Heart Failure Paternal Grandfather        Current Outpatient Prescriptions on File Prior to Visit   Medication Sig Dispense Refill    Insulin Syringe-Needle U-100 ([employer] INSULIN SYR 1CC/30G) 30G X 5/16\" 1 ML MISC 1 each by Does not apply route 2 times daily 100 each 11    Omega 3-6-9 Fatty Acids (OMEGA 3-6-9 COMPLEX PO) Take by mouth      sildenafil (VIAGRA) 100 MG tablet Take 1 tablet by mouth as needed for Erectile Dysfunction 15 tablet 11    glucose ([employer] GLUCOSE) 4 G chewable tablet Take 4 tablets by mouth as needed for Low blood sugar (Patient taking differently: Take 4 tablets by mouth as needed for Low blood sugar) 60 tablet 0    Cinnamon 500 MG CAPS Take 500 mg by mouth 2 times daily.  Multiple Vitamins-Minerals (MULTIVITAMIN PO) Take 1 tablet by mouth.  aspirin 81 MG tablet Take 81 mg by mouth daily.       esomeprazole (NEXIUM) 40 MG delayed release capsule Take 1 capsule by mouth daily 30 capsule 11     No current

## 2018-05-07 ENCOUNTER — OFFICE VISIT (OUTPATIENT)
Dept: CARDIOLOGY CLINIC | Age: 68
End: 2018-05-07

## 2018-05-07 VITALS
SYSTOLIC BLOOD PRESSURE: 128 MMHG | BODY MASS INDEX: 32.74 KG/M2 | DIASTOLIC BLOOD PRESSURE: 64 MMHG | HEIGHT: 68 IN | HEART RATE: 60 BPM | WEIGHT: 216 LBS

## 2018-05-07 DIAGNOSIS — Z98.61 HISTORY OF PTCA: Chronic | ICD-10-CM

## 2018-05-07 DIAGNOSIS — I10 ESSENTIAL HYPERTENSION: Chronic | ICD-10-CM

## 2018-05-07 DIAGNOSIS — E78.2 HYPERLIPIDEMIA, MIXED: Chronic | ICD-10-CM

## 2018-05-07 DIAGNOSIS — I20.9 ANGINA, CLASS II (HCC): Primary | Chronic | ICD-10-CM

## 2018-05-07 DIAGNOSIS — K21.9 GASTROESOPHAGEAL REFLUX DISEASE WITHOUT ESOPHAGITIS: ICD-10-CM

## 2018-05-07 DIAGNOSIS — I77.9 BILATERAL CAROTID ARTERY DISEASE (HCC): ICD-10-CM

## 2018-05-07 PROCEDURE — 1036F TOBACCO NON-USER: CPT | Performed by: INTERNAL MEDICINE

## 2018-05-07 PROCEDURE — G8427 DOCREV CUR MEDS BY ELIG CLIN: HCPCS | Performed by: INTERNAL MEDICINE

## 2018-05-07 PROCEDURE — 4040F PNEUMOC VAC/ADMIN/RCVD: CPT | Performed by: INTERNAL MEDICINE

## 2018-05-07 PROCEDURE — G8417 CALC BMI ABV UP PARAM F/U: HCPCS | Performed by: INTERNAL MEDICINE

## 2018-05-07 PROCEDURE — 99214 OFFICE O/P EST MOD 30 MIN: CPT | Performed by: INTERNAL MEDICINE

## 2018-05-07 PROCEDURE — G8598 ASA/ANTIPLAT THER USED: HCPCS | Performed by: INTERNAL MEDICINE

## 2018-05-07 PROCEDURE — 3017F COLORECTAL CA SCREEN DOC REV: CPT | Performed by: INTERNAL MEDICINE

## 2018-05-07 PROCEDURE — 1123F ACP DISCUSS/DSCN MKR DOCD: CPT | Performed by: INTERNAL MEDICINE

## 2018-05-15 ENCOUNTER — PROCEDURE VISIT (OUTPATIENT)
Dept: CARDIOLOGY CLINIC | Age: 68
End: 2018-05-15

## 2018-05-15 DIAGNOSIS — K21.9 GASTROESOPHAGEAL REFLUX DISEASE WITHOUT ESOPHAGITIS: ICD-10-CM

## 2018-05-15 DIAGNOSIS — Z98.61 HISTORY OF PTCA: Chronic | ICD-10-CM

## 2018-05-15 DIAGNOSIS — I20.9 ANGINA, CLASS II (HCC): Chronic | ICD-10-CM

## 2018-05-15 DIAGNOSIS — E78.2 HYPERLIPIDEMIA, MIXED: Chronic | ICD-10-CM

## 2018-05-15 DIAGNOSIS — I10 ESSENTIAL HYPERTENSION: Chronic | ICD-10-CM

## 2018-05-15 PROCEDURE — 93880 EXTRACRANIAL BILAT STUDY: CPT | Performed by: INTERNAL MEDICINE

## 2018-05-21 ENCOUNTER — TELEPHONE (OUTPATIENT)
Dept: CARDIOLOGY CLINIC | Age: 68
End: 2018-05-21

## 2018-06-18 ENCOUNTER — TELEPHONE (OUTPATIENT)
Dept: FAMILY MEDICINE CLINIC | Age: 68
End: 2018-06-18

## 2018-06-18 ENCOUNTER — OFFICE VISIT (OUTPATIENT)
Dept: FAMILY MEDICINE CLINIC | Age: 68
End: 2018-06-18

## 2018-06-18 VITALS
BODY MASS INDEX: 32.84 KG/M2 | HEART RATE: 62 BPM | DIASTOLIC BLOOD PRESSURE: 68 MMHG | SYSTOLIC BLOOD PRESSURE: 118 MMHG | WEIGHT: 216 LBS

## 2018-06-18 DIAGNOSIS — E78.5 HYPERLIPIDEMIA, UNSPECIFIED HYPERLIPIDEMIA TYPE: ICD-10-CM

## 2018-06-18 DIAGNOSIS — E11.9 TYPE 2 DIABETES MELLITUS WITHOUT COMPLICATION, WITH LONG-TERM CURRENT USE OF INSULIN (HCC): Primary | ICD-10-CM

## 2018-06-18 DIAGNOSIS — I10 ESSENTIAL HYPERTENSION: Chronic | ICD-10-CM

## 2018-06-18 DIAGNOSIS — K21.9 GASTROESOPHAGEAL REFLUX DISEASE WITHOUT ESOPHAGITIS: ICD-10-CM

## 2018-06-18 DIAGNOSIS — Z79.4 TYPE 2 DIABETES MELLITUS WITHOUT COMPLICATION, WITH LONG-TERM CURRENT USE OF INSULIN (HCC): Primary | ICD-10-CM

## 2018-06-18 DIAGNOSIS — Z23 NEED FOR SHINGLES VACCINE: ICD-10-CM

## 2018-06-18 LAB — HBA1C MFR BLD: 7.8 %

## 2018-06-18 PROCEDURE — 2022F DILAT RTA XM EVC RTNOPTHY: CPT | Performed by: FAMILY MEDICINE

## 2018-06-18 PROCEDURE — 4040F PNEUMOC VAC/ADMIN/RCVD: CPT | Performed by: FAMILY MEDICINE

## 2018-06-18 PROCEDURE — 3045F PR MOST RECENT HEMOGLOBIN A1C LEVEL 7.0-9.0%: CPT | Performed by: FAMILY MEDICINE

## 2018-06-18 PROCEDURE — 1123F ACP DISCUSS/DSCN MKR DOCD: CPT | Performed by: FAMILY MEDICINE

## 2018-06-18 PROCEDURE — G8598 ASA/ANTIPLAT THER USED: HCPCS | Performed by: FAMILY MEDICINE

## 2018-06-18 PROCEDURE — 83036 HEMOGLOBIN GLYCOSYLATED A1C: CPT | Performed by: FAMILY MEDICINE

## 2018-06-18 PROCEDURE — G8417 CALC BMI ABV UP PARAM F/U: HCPCS | Performed by: FAMILY MEDICINE

## 2018-06-18 PROCEDURE — 99214 OFFICE O/P EST MOD 30 MIN: CPT | Performed by: FAMILY MEDICINE

## 2018-06-18 PROCEDURE — G8427 DOCREV CUR MEDS BY ELIG CLIN: HCPCS | Performed by: FAMILY MEDICINE

## 2018-06-18 PROCEDURE — 1036F TOBACCO NON-USER: CPT | Performed by: FAMILY MEDICINE

## 2018-06-18 PROCEDURE — 3017F COLORECTAL CA SCREEN DOC REV: CPT | Performed by: FAMILY MEDICINE

## 2018-06-18 RX ORDER — ESOMEPRAZOLE MAGNESIUM 40 MG/1
40 CAPSULE, DELAYED RELEASE ORAL DAILY
Qty: 30 CAPSULE | Refills: 11 | Status: SHIPPED | OUTPATIENT
Start: 2018-06-18 | End: 2019-04-22 | Stop reason: SDUPTHER

## 2018-06-18 RX ORDER — LOSARTAN POTASSIUM 25 MG/1
25 TABLET ORAL NIGHTLY
Qty: 30 TABLET | Refills: 2 | Status: SHIPPED | OUTPATIENT
Start: 2018-06-18 | End: 2018-09-10 | Stop reason: SDUPTHER

## 2018-06-18 RX ORDER — ROSUVASTATIN CALCIUM 20 MG/1
20 TABLET, COATED ORAL DAILY
Qty: 30 TABLET | Refills: 2 | Status: SHIPPED | OUTPATIENT
Start: 2018-06-18 | End: 2018-09-10 | Stop reason: SDUPTHER

## 2018-06-18 ASSESSMENT — ENCOUNTER SYMPTOMS
CHEST TIGHTNESS: 0
SHORTNESS OF BREATH: 0

## 2018-06-18 ASSESSMENT — PATIENT HEALTH QUESTIONNAIRE - PHQ9
2. FEELING DOWN, DEPRESSED OR HOPELESS: 0
SUM OF ALL RESPONSES TO PHQ9 QUESTIONS 1 & 2: 0
SUM OF ALL RESPONSES TO PHQ QUESTIONS 1-9: 0
1. LITTLE INTEREST OR PLEASURE IN DOING THINGS: 0

## 2018-09-10 ENCOUNTER — OFFICE VISIT (OUTPATIENT)
Dept: FAMILY MEDICINE CLINIC | Age: 68
End: 2018-09-10

## 2018-09-10 VITALS
DIASTOLIC BLOOD PRESSURE: 80 MMHG | BODY MASS INDEX: 33.1 KG/M2 | HEART RATE: 66 BPM | HEIGHT: 68 IN | WEIGHT: 218.4 LBS | SYSTOLIC BLOOD PRESSURE: 120 MMHG

## 2018-09-10 DIAGNOSIS — E11.8 TYPE 2 DIABETES MELLITUS WITH COMPLICATION, WITH LONG-TERM CURRENT USE OF INSULIN (HCC): Primary | ICD-10-CM

## 2018-09-10 DIAGNOSIS — K21.9 GASTROESOPHAGEAL REFLUX DISEASE WITHOUT ESOPHAGITIS: ICD-10-CM

## 2018-09-10 DIAGNOSIS — E78.5 HYPERLIPIDEMIA, UNSPECIFIED HYPERLIPIDEMIA TYPE: ICD-10-CM

## 2018-09-10 DIAGNOSIS — Z79.4 TYPE 2 DIABETES MELLITUS WITH COMPLICATION, WITH LONG-TERM CURRENT USE OF INSULIN (HCC): Primary | ICD-10-CM

## 2018-09-10 DIAGNOSIS — I10 ESSENTIAL HYPERTENSION: Chronic | ICD-10-CM

## 2018-09-10 DIAGNOSIS — Z23 NEED FOR INFLUENZA VACCINATION: ICD-10-CM

## 2018-09-10 LAB — HBA1C MFR BLD: 7.5 %

## 2018-09-10 PROCEDURE — 3017F COLORECTAL CA SCREEN DOC REV: CPT | Performed by: FAMILY MEDICINE

## 2018-09-10 PROCEDURE — 2022F DILAT RTA XM EVC RTNOPTHY: CPT | Performed by: FAMILY MEDICINE

## 2018-09-10 PROCEDURE — 1123F ACP DISCUSS/DSCN MKR DOCD: CPT | Performed by: FAMILY MEDICINE

## 2018-09-10 PROCEDURE — 4040F PNEUMOC VAC/ADMIN/RCVD: CPT | Performed by: FAMILY MEDICINE

## 2018-09-10 PROCEDURE — 1036F TOBACCO NON-USER: CPT | Performed by: FAMILY MEDICINE

## 2018-09-10 PROCEDURE — 1101F PT FALLS ASSESS-DOCD LE1/YR: CPT | Performed by: FAMILY MEDICINE

## 2018-09-10 PROCEDURE — 83036 HEMOGLOBIN GLYCOSYLATED A1C: CPT | Performed by: FAMILY MEDICINE

## 2018-09-10 PROCEDURE — 99214 OFFICE O/P EST MOD 30 MIN: CPT | Performed by: FAMILY MEDICINE

## 2018-09-10 PROCEDURE — 90662 IIV NO PRSV INCREASED AG IM: CPT | Performed by: FAMILY MEDICINE

## 2018-09-10 PROCEDURE — G8427 DOCREV CUR MEDS BY ELIG CLIN: HCPCS | Performed by: FAMILY MEDICINE

## 2018-09-10 PROCEDURE — G8417 CALC BMI ABV UP PARAM F/U: HCPCS | Performed by: FAMILY MEDICINE

## 2018-09-10 PROCEDURE — G8598 ASA/ANTIPLAT THER USED: HCPCS | Performed by: FAMILY MEDICINE

## 2018-09-10 PROCEDURE — 3045F PR MOST RECENT HEMOGLOBIN A1C LEVEL 7.0-9.0%: CPT | Performed by: FAMILY MEDICINE

## 2018-09-10 PROCEDURE — G0008 ADMIN INFLUENZA VIRUS VAC: HCPCS | Performed by: FAMILY MEDICINE

## 2018-09-10 RX ORDER — LOSARTAN POTASSIUM 25 MG/1
25 TABLET ORAL NIGHTLY
Qty: 30 TABLET | Refills: 2 | Status: SHIPPED | OUTPATIENT
Start: 2018-09-10 | End: 2018-12-20 | Stop reason: SDUPTHER

## 2018-09-10 RX ORDER — ROSUVASTATIN CALCIUM 20 MG/1
20 TABLET, COATED ORAL DAILY
Qty: 30 TABLET | Refills: 2 | Status: SHIPPED | OUTPATIENT
Start: 2018-09-10 | End: 2018-12-20 | Stop reason: SDUPTHER

## 2018-09-10 ASSESSMENT — ENCOUNTER SYMPTOMS: SHORTNESS OF BREATH: 0

## 2018-09-10 NOTE — PATIENT INSTRUCTIONS
when cooking. · Don't skip meals. Your blood sugar may drop too low if you skip meals and take insulin or certain medicines for diabetes. · Check with your doctor before you drink alcohol. Alcohol can cause your blood sugar to drop too low. Alcohol can also cause a bad reaction if you take certain diabetes medicines. Follow-up care is a key part of your treatment and safety. Be sure to make and go to all appointments, and call your doctor if you are having problems. It's also a good idea to know your test results and keep a list of the medicines you take. Where can you learn more? Go to [email or web address] org and sign in to your [employer] account. Enter R111 in the [employer] box to learn more about \"Learning About Diabetes Food Guidelines. \"     If you do not have an account, please click on the \"Sign Up Now\" link. Current as of: December 7, 2017  Content Version: 11.7  © 2825-1858 [employer]. Care instructions adapted under license by [facility] ([facility]). If you have questions about a medical condition or this instruction, always ask your healthcare professional. [place] [address] any warranty or liability for your use of this information.

## 2018-09-10 NOTE — PROGRESS NOTES
Patient ID: Ulysses Flight 1950    . Chief Complaint   Patient presents with    Diabetes         Diabetes   He presents for his initial diabetic visit. He has type 2 diabetes mellitus. His disease course has been fluctuating. Pertinent negatives for diabetes include no chest pain. Symptoms are stable. Diabetic complications include heart disease. Current diabetic treatment includes insulin injections. He is compliant with treatment some of the time (sugars go up when he eats poorly). His weight is stable. He is following a generally healthy diet. When asked about meal planning, he reported none. He has not had a previous visit with a dietitian. His home blood glucose trend is fluctuating dramatically. His breakfast blood glucose range is generally 70-90 mg/dl. His dinner blood glucose range is generally 70-90 mg/dl. (When eats out with wife at night, the sugar may be over 200) An ACE inhibitor/angiotensin II receptor blocker is being taken. Hypertension   This is a chronic problem. Pertinent negatives include no chest pain, palpitations or shortness of breath. Past treatments include angiotensin blockers. The current treatment provides significant improvement. Gastroesophageal Reflux   He reports no chest pain. better with Celexa. He has tried a PPI for the symptoms. The treatment provided significant relief. Mental Health Problem   Primary symptoms comment: better with Celexa. This is a chronic problem. The onset of the illness is precipitated by emotional stress. Review of Systems   Respiratory: Negative for shortness of breath. Cardiovascular: Negative for chest pain and palpitations.        Patient Active Problem List   Diagnosis    Angina, class II ([address]    Abnormal nuclear cardiac imaging test    History of PTCA    Essential hypertension    Hyperlipidemia, mixed    Gastroesophageal reflux disease without esophagitis    Bilateral carotid artery disease ([address]    Diabetes mellitus [facility]       Past Medical History:   Diagnosis Date    Arthritis     CAD (coronary artery disease)     [facility] mellitus [facility]     Hx of Doppler ultrasound 05/15/2018    Carotid-mild 0-49% bilateral    Hyperlipidemia     Hypertension     MDRO (multiple drug resistant organisms) resistance     MRSA in Rt elbow 11/13       Past Surgical History:   Procedure Laterality Date    ADENOIDECTOMY      CARDIAC CATHETERIZATION      Heart cath with stent placed in back side of heart    COLONOSCOPY  02/25/2014    TOTAL KNEE ARTHROPLASTY Right 12/2015    Right knee replaced    TOTAL KNEE ARTHROPLASTY Left 07/2014    Left knee replaced    VEIN SURGERY Right 2015    Vein stripped in right leg       Family History   Problem Relation Age of Onset    Osteoarthritis Mother     Rheumatologic Disease Mother         Raynaud's/ Crest     Heart Disease Father     Asthma Father     Diabetes Father     Heart Failure Father     Seizures Brother     Cancer Maternal Aunt     Stroke Maternal Uncle     Diabetes Paternal Aunt     High Cholesterol Paternal [de-identified]     Rashes/Skin Problems Maternal Grandmother     Stroke Maternal Grandmother     Diabetes Paternal Grandmother     Heart Failure Paternal Grandfather        Current Outpatient Prescriptions on File Prior to Visit   Medication Sig Dispense Refill    esomeprazole (NEXIUM) 40 MG delayed release capsule Take 1 capsule by mouth daily 30 capsule 11    glucose blood VI test strips (SVETA CONTOUR NEXT TEST) strip 1 each by In Vitro route 2 times daily As needed.  citalopram (CELEXA) 40 MG tablet Take 1 tablet by mouth daily 30 tablet 11    Insulin Syringe-Needle U-100 ([employer] INSULIN SYR 1CC/30G) 30G X 5/16\" 1 ML MISC 1 each by Does not apply route 2 times daily 100 each 11    Omega 3-6-9 Fatty Acids (OMEGA 3-6-9 COMPLEX PO) Take by mouth      Cinnamon 500 MG CAPS Take 500 mg by mouth 2 times daily.       Multiple Vitamins-Minerals (MULTIVITAMIN PO) Take 1 tablet by mouth.  aspirin 81 MG tablet Take 81 mg by mouth daily.  esomeprazole Magnesium (NEXIUM) 20 MG PACK Take 20 mg by mouth daily      glucose ([employer] GLUCOSE) 4 G chewable tablet Take 4 tablets by mouth as needed for Low blood sugar (Patient taking differently: Take 4 tablets by mouth as needed for Low blood sugar) 60 tablet 0     No current facility-administered medications on file prior to visit. Objective:   Physical Exam   Constitutional: He is oriented to person, place, and time. He appears well-developed and well-nourished. No distress. HENT:   Head: Normocephalic. Neck: No tracheal deviation present. No thyromegaly present. Cardiovascular: Normal rate, regular rhythm, S1 normal, S2 normal, normal heart sounds and intact distal pulses. Exam reveals no gallop. No murmur heard. Pulses:       Dorsalis pedis pulses are 2+ on the right side, and 2+ on the left side. Posterior tibial pulses are 2+ on the right side, and 2+ on the left side. Carotids without bruits   Pulmonary/Chest: Effort normal and breath sounds normal. No respiratory distress. He has no wheezes. He has no rales. Abdominal: Soft. Normal aorta. He exhibits no distension and no mass. There is no tenderness. Musculoskeletal: He exhibits no edema. Feet without lesions     Neurological: He is alert and oriented to person, place, and time. Intact monofilament testing both feet     Skin: Skin is warm and dry. Psychiatric: He has a normal mood and affect. His behavior is normal.     Vitals:    09/10/18 0918   BP: 120/80   Pulse: 66   Weight: 218 lb 6.4 oz (99.1 kg)   Height: 5' 8\" (1.727 m)     Body mass index is 33.21 kg/m².      Wt Readings from Last 3 Encounters:   09/10/18 218 lb 6.4 oz (99.1 kg)   06/18/18 216 lb (98 kg)   05/07/18 216 lb (98 kg)     BP Readings from Last 3 Encounters:   09/10/18 120/80   06/18/18 118/68   05/07/18 128/64          Results for orders placed or

## 2018-12-18 LAB
ALBUMIN SERPL-MCNC: 4.1 G/DL
ALP BLD-CCNC: 74 U/L
ALT SERPL-CCNC: 13 U/L
ANION GAP SERPL CALCULATED.3IONS-SCNC: 1.4 MMOL/L
AST SERPL-CCNC: 17 U/L
BILIRUB SERPL-MCNC: 0.4 MG/DL (ref 0.1–1.4)
BUN BLDV-MCNC: 19 MG/DL
CALCIUM SERPL-MCNC: 9.3 MG/DL
CHLORIDE BLD-SCNC: 99 MMOL/L
CHOLESTEROL, TOTAL: 178 MG/DL
CHOLESTEROL/HDL RATIO: 3.24
CO2: 25 MMOL/L
CREAT SERPL-MCNC: 1.04 MG/DL
GFR CALCULATED: 74
GLUCOSE BLD-MCNC: 128 MG/DL
HDLC SERPL-MCNC: 55 MG/DL (ref 35–70)
LDL CHOLESTEROL CALCULATED: 109 MG/DL (ref 0–160)
POTASSIUM SERPL-SCNC: 5 MMOL/L
SODIUM BLD-SCNC: 139 MMOL/L
TOTAL PROTEIN: 7.1
TRIGL SERPL-MCNC: 72 MG/DL
VLDLC SERPL CALC-MCNC: 14 MG/DL

## 2018-12-20 ENCOUNTER — OFFICE VISIT (OUTPATIENT)
Dept: FAMILY MEDICINE CLINIC | Age: 68
End: 2018-12-20
Payer: COMMERCIAL

## 2018-12-20 VITALS
HEIGHT: 68 IN | WEIGHT: 218.4 LBS | HEART RATE: 89 BPM | SYSTOLIC BLOOD PRESSURE: 132 MMHG | BODY MASS INDEX: 33.1 KG/M2 | DIASTOLIC BLOOD PRESSURE: 70 MMHG

## 2018-12-20 DIAGNOSIS — I10 ESSENTIAL HYPERTENSION: Chronic | ICD-10-CM

## 2018-12-20 DIAGNOSIS — E11.8 TYPE 2 DIABETES MELLITUS WITH COMPLICATION, WITH LONG-TERM CURRENT USE OF INSULIN (HCC): Primary | ICD-10-CM

## 2018-12-20 DIAGNOSIS — Z79.4 TYPE 2 DIABETES MELLITUS WITH COMPLICATION, WITH LONG-TERM CURRENT USE OF INSULIN (HCC): Primary | ICD-10-CM

## 2018-12-20 DIAGNOSIS — E78.5 HYPERLIPIDEMIA, UNSPECIFIED HYPERLIPIDEMIA TYPE: ICD-10-CM

## 2018-12-20 LAB — HBA1C MFR BLD: 7.5 %

## 2018-12-20 PROCEDURE — G8482 FLU IMMUNIZE ORDER/ADMIN: HCPCS | Performed by: FAMILY MEDICINE

## 2018-12-20 PROCEDURE — 3045F PR MOST RECENT HEMOGLOBIN A1C LEVEL 7.0-9.0%: CPT | Performed by: FAMILY MEDICINE

## 2018-12-20 PROCEDURE — 4040F PNEUMOC VAC/ADMIN/RCVD: CPT | Performed by: FAMILY MEDICINE

## 2018-12-20 PROCEDURE — 83036 HEMOGLOBIN GLYCOSYLATED A1C: CPT | Performed by: FAMILY MEDICINE

## 2018-12-20 PROCEDURE — G8427 DOCREV CUR MEDS BY ELIG CLIN: HCPCS | Performed by: FAMILY MEDICINE

## 2018-12-20 PROCEDURE — 1123F ACP DISCUSS/DSCN MKR DOCD: CPT | Performed by: FAMILY MEDICINE

## 2018-12-20 PROCEDURE — 1101F PT FALLS ASSESS-DOCD LE1/YR: CPT | Performed by: FAMILY MEDICINE

## 2018-12-20 PROCEDURE — 1036F TOBACCO NON-USER: CPT | Performed by: FAMILY MEDICINE

## 2018-12-20 PROCEDURE — G8598 ASA/ANTIPLAT THER USED: HCPCS | Performed by: FAMILY MEDICINE

## 2018-12-20 PROCEDURE — G8417 CALC BMI ABV UP PARAM F/U: HCPCS | Performed by: FAMILY MEDICINE

## 2018-12-20 PROCEDURE — 99213 OFFICE O/P EST LOW 20 MIN: CPT | Performed by: FAMILY MEDICINE

## 2018-12-20 PROCEDURE — 2022F DILAT RTA XM EVC RTNOPTHY: CPT | Performed by: FAMILY MEDICINE

## 2018-12-20 PROCEDURE — 3017F COLORECTAL CA SCREEN DOC REV: CPT | Performed by: FAMILY MEDICINE

## 2018-12-20 RX ORDER — ROSUVASTATIN CALCIUM 20 MG/1
20 TABLET, COATED ORAL DAILY
Qty: 30 TABLET | Refills: 2 | Status: SHIPPED | OUTPATIENT
Start: 2018-12-20 | End: 2019-04-22 | Stop reason: SDUPTHER

## 2018-12-20 RX ORDER — LOSARTAN POTASSIUM 25 MG/1
25 TABLET ORAL NIGHTLY
Qty: 30 TABLET | Refills: 2 | Status: SHIPPED | OUTPATIENT
Start: 2018-12-20 | End: 2019-04-22 | Stop reason: SDUPTHER

## 2018-12-20 ASSESSMENT — ENCOUNTER SYMPTOMS: SHORTNESS OF BREATH: 0

## 2018-12-20 NOTE — PROGRESS NOTES
BP Readings from Last 2 Encounters:   12/20/18 132/70   09/10/18 120/80     Hemoglobin A1C (%)   Date Value   09/10/2018 7.5     Microalbumin, Random Urine (mg/dL)   Date Value   03/26/2018 <1.20     LDL Calculated (mg/dL)   Date Value   12/18/2018 109              Tobacco use:  Patient  reports that he has never smoked. He has never used smokeless tobacco.    If Smoker - Cessation materials given? NA    Is Daily aspirin on medication list?:  Yes    Diabetic retinal exam done? No   If yes, document in Health Maintenance. Monofilament placed on counter? Yes    Shoes and socks removed? Yes    BP taken with correct size cuff? Yes   Repeated if > 140/90 NA     Is patient taking any medications for diabetes    Yes   If yes, see medication list    Is the patient reporting any side effects of diabetic medications? No    Microalbumin performed if applicable?   NA      Is patient taking any over the counter medications    Yes   If yes, see medication list
G chewable tablet Take 4 tablets by mouth as needed for Low blood sugar (Patient taking differently: Take 4 tablets by mouth as needed for Low blood sugar) 60 tablet 0     No current facility-administered medications on file prior to visit. Objective:   Physical Exam  Vitals:    12/20/18 1620   BP: 132/70   Site: Right Upper Arm   Position: Sitting   Cuff Size: Medium Adult   Pulse: 89   Weight: 218 lb 6.4 oz (99.1 kg)   Height: 5' 8\" (1.727 m)     Body mass index is 33.21 kg/m². Wt Readings from Last 3 Encounters:   12/20/18 218 lb 6.4 oz (99.1 kg)   09/10/18 218 lb 6.4 oz (99.1 kg)   06/18/18 216 lb (98 kg)     BP Readings from Last 3 Encounters:   12/20/18 132/70   09/10/18 120/80   06/18/18 118/68          Results for orders placed or performed in visit on 12/20/18   POCT glycosylated hemoglobin (Hb A1C)   Result Value Ref Range    Hemoglobin A1C 7.5 %     The ASCVD Risk score (Alex Sims et al., 2013) failed to calculate for the following reasons:     The patient has a prior MI or stroke diagnosis  Lab Review   Abstract on 12/19/2018   Component Date Value    Sodium 12/18/2018 139     Chloride 12/18/2018 99     Potassium 12/18/2018 5.0     BUN 12/18/2018 19     CREATININE 12/18/2018 1.04     Glucose 12/18/2018 128     AST 12/18/2018 17     ALT 12/18/2018 13     Calcium 12/18/2018 9.3     Total Protein 12/18/2018 7.1     CO2 12/18/2018 25     Alb 12/18/2018 4.1     Alkaline Phosphatase 12/18/2018 74     Total Bilirubin 12/18/2018 0.4     Gfr Calculated 12/18/2018 74     Anion Gap 12/18/2018 1.4     Cholesterol, Total 12/18/2018 178     HDL 12/18/2018 55     LDL Calculated 12/18/2018 109     Triglycerides 12/18/2018 72     Chol/HDL Ratio 12/18/2018 3.24     VLDL 12/18/2018 14        Results for orders placed or performed in visit on 12/20/18   POCT glycosylated hemoglobin (Hb A1C)   Result Value Ref Range    Hemoglobin A1C 7.5 %         Assessment:       Diagnosis

## 2018-12-20 NOTE — PATIENT INSTRUCTIONS
good, quick way to make sure that you have a balanced meal. It also helps you spread carbs throughout the day. ? Divide your plate by types of foods. Put non-starchy vegetables on half the plate, meat or other protein food on one-quarter of the plate, and a grain or starchy vegetable in the final quarter of the plate. To this you can add a small piece of fruit and 1 cup of milk or yogurt, depending on how many carbs you are supposed to eat at a meal.  · Try to eat about the same amount of carbs at each meal. Do not \"save up\" your daily allowance of carbs to eat at one meal.  · Proteins have very little or no carbs per serving. Examples of proteins are beef, chicken, turkey, fish, eggs, tofu, cheese, cottage cheese, and peanut butter. A serving size of meat is 3 ounces, which is about the size of a deck of cards. Examples of meat substitute serving sizes (equal to 1 ounce of meat) are 1/4 cup of cottage cheese, 1 egg, 1 tablespoon of peanut butter, and ½ cup of tofu. How can you eat out and still eat healthy? · Learn to estimate the serving sizes of foods that have carbohydrate. If you measure food at home, it will be easier to estimate the amount in a serving of restaurant food. · If the meal you order has too much carbohydrate (such as potatoes, corn, or baked beans), ask to have a low-carbohydrate food instead. Ask for a salad or green vegetables. · If you use insulin, check your blood sugar before and after eating out to help you plan how much to eat in the future. · If you eat more carbohydrate at a meal than you had planned, take a walk or do other exercise. This will help lower your blood sugar. What else should you know? · Limit saturated fat, such as the fat from meat and dairy products. This is a healthy choice because people who have diabetes are at higher risk of heart disease. So choose lean cuts of meat and nonfat or low-fat dairy products.  Use olive or canola oil instead of butter or shortening

## 2018-12-21 ASSESSMENT — ENCOUNTER SYMPTOMS: CHEST TIGHTNESS: 0

## 2019-01-04 ENCOUNTER — OFFICE VISIT (OUTPATIENT)
Dept: FAMILY MEDICINE CLINIC | Age: 69
End: 2019-01-04
Payer: COMMERCIAL

## 2019-01-04 VITALS
SYSTOLIC BLOOD PRESSURE: 132 MMHG | RESPIRATION RATE: 20 BRPM | WEIGHT: 212.4 LBS | OXYGEN SATURATION: 99 % | BODY MASS INDEX: 32.19 KG/M2 | TEMPERATURE: 97.6 F | HEART RATE: 67 BPM | DIASTOLIC BLOOD PRESSURE: 69 MMHG | HEIGHT: 68 IN

## 2019-01-04 DIAGNOSIS — R05.9 COUGH: Primary | ICD-10-CM

## 2019-01-04 PROCEDURE — 4040F PNEUMOC VAC/ADMIN/RCVD: CPT | Performed by: FAMILY MEDICINE

## 2019-01-04 PROCEDURE — 99213 OFFICE O/P EST LOW 20 MIN: CPT | Performed by: FAMILY MEDICINE

## 2019-01-04 PROCEDURE — G8417 CALC BMI ABV UP PARAM F/U: HCPCS | Performed by: FAMILY MEDICINE

## 2019-01-04 PROCEDURE — 1123F ACP DISCUSS/DSCN MKR DOCD: CPT | Performed by: FAMILY MEDICINE

## 2019-01-04 PROCEDURE — 1101F PT FALLS ASSESS-DOCD LE1/YR: CPT | Performed by: FAMILY MEDICINE

## 2019-01-04 PROCEDURE — G8482 FLU IMMUNIZE ORDER/ADMIN: HCPCS | Performed by: FAMILY MEDICINE

## 2019-01-04 PROCEDURE — G8427 DOCREV CUR MEDS BY ELIG CLIN: HCPCS | Performed by: FAMILY MEDICINE

## 2019-01-04 PROCEDURE — 3017F COLORECTAL CA SCREEN DOC REV: CPT | Performed by: FAMILY MEDICINE

## 2019-01-04 PROCEDURE — G8598 ASA/ANTIPLAT THER USED: HCPCS | Performed by: FAMILY MEDICINE

## 2019-01-04 PROCEDURE — 1036F TOBACCO NON-USER: CPT | Performed by: FAMILY MEDICINE

## 2019-01-04 RX ORDER — PROMETHAZINE HYDROCHLORIDE AND CODEINE PHOSPHATE 6.25; 1 MG/5ML; MG/5ML
5 SYRUP ORAL EVERY 4 HOURS PRN
Qty: 100 ML | Refills: 0 | Status: SHIPPED | OUTPATIENT
Start: 2019-01-04 | End: 2019-01-11

## 2019-04-22 ENCOUNTER — OFFICE VISIT (OUTPATIENT)
Dept: FAMILY MEDICINE CLINIC | Age: 69
End: 2019-04-22
Payer: COMMERCIAL

## 2019-04-22 VITALS
WEIGHT: 218.4 LBS | HEIGHT: 68 IN | BODY MASS INDEX: 33.1 KG/M2 | DIASTOLIC BLOOD PRESSURE: 70 MMHG | SYSTOLIC BLOOD PRESSURE: 120 MMHG | HEART RATE: 69 BPM

## 2019-04-22 DIAGNOSIS — E11.8 TYPE 2 DIABETES MELLITUS WITH COMPLICATION, WITH LONG-TERM CURRENT USE OF INSULIN (HCC): Primary | ICD-10-CM

## 2019-04-22 DIAGNOSIS — F41.9 ANXIETY: ICD-10-CM

## 2019-04-22 DIAGNOSIS — K21.9 GASTROESOPHAGEAL REFLUX DISEASE WITHOUT ESOPHAGITIS: ICD-10-CM

## 2019-04-22 DIAGNOSIS — I10 ESSENTIAL HYPERTENSION: Chronic | ICD-10-CM

## 2019-04-22 DIAGNOSIS — Z79.4 TYPE 2 DIABETES MELLITUS WITH COMPLICATION, WITH LONG-TERM CURRENT USE OF INSULIN (HCC): Primary | ICD-10-CM

## 2019-04-22 DIAGNOSIS — E78.5 HYPERLIPIDEMIA, UNSPECIFIED HYPERLIPIDEMIA TYPE: ICD-10-CM

## 2019-04-22 LAB — HBA1C MFR BLD: 8.7 %

## 2019-04-22 PROCEDURE — 83036 HEMOGLOBIN GLYCOSYLATED A1C: CPT | Performed by: FAMILY MEDICINE

## 2019-04-22 PROCEDURE — 4040F PNEUMOC VAC/ADMIN/RCVD: CPT | Performed by: FAMILY MEDICINE

## 2019-04-22 PROCEDURE — G8427 DOCREV CUR MEDS BY ELIG CLIN: HCPCS | Performed by: FAMILY MEDICINE

## 2019-04-22 PROCEDURE — G8598 ASA/ANTIPLAT THER USED: HCPCS | Performed by: FAMILY MEDICINE

## 2019-04-22 PROCEDURE — 99214 OFFICE O/P EST MOD 30 MIN: CPT | Performed by: FAMILY MEDICINE

## 2019-04-22 PROCEDURE — 3045F PR MOST RECENT HEMOGLOBIN A1C LEVEL 7.0-9.0%: CPT | Performed by: FAMILY MEDICINE

## 2019-04-22 PROCEDURE — 2022F DILAT RTA XM EVC RTNOPTHY: CPT | Performed by: FAMILY MEDICINE

## 2019-04-22 PROCEDURE — 1036F TOBACCO NON-USER: CPT | Performed by: FAMILY MEDICINE

## 2019-04-22 PROCEDURE — G8417 CALC BMI ABV UP PARAM F/U: HCPCS | Performed by: FAMILY MEDICINE

## 2019-04-22 PROCEDURE — 1123F ACP DISCUSS/DSCN MKR DOCD: CPT | Performed by: FAMILY MEDICINE

## 2019-04-22 PROCEDURE — 3017F COLORECTAL CA SCREEN DOC REV: CPT | Performed by: FAMILY MEDICINE

## 2019-04-22 RX ORDER — ESOMEPRAZOLE MAGNESIUM 40 MG/1
40 CAPSULE, DELAYED RELEASE ORAL DAILY
Qty: 30 CAPSULE | Refills: 11 | Status: SHIPPED | OUTPATIENT
Start: 2019-04-22 | End: 2020-04-01 | Stop reason: SDUPTHER

## 2019-04-22 RX ORDER — LOSARTAN POTASSIUM 25 MG/1
25 TABLET ORAL NIGHTLY
Qty: 30 TABLET | Refills: 2 | Status: SHIPPED | OUTPATIENT
Start: 2019-04-22 | End: 2019-07-15 | Stop reason: SDUPTHER

## 2019-04-22 RX ORDER — ROSUVASTATIN CALCIUM 20 MG/1
20 TABLET, COATED ORAL DAILY
Qty: 30 TABLET | Refills: 2 | Status: SHIPPED | OUTPATIENT
Start: 2019-04-22 | End: 2019-07-15 | Stop reason: SDUPTHER

## 2019-04-22 RX ORDER — CITALOPRAM 40 MG/1
40 TABLET ORAL DAILY
Qty: 30 TABLET | Refills: 11 | Status: SHIPPED | OUTPATIENT
Start: 2019-04-22 | End: 2020-04-01 | Stop reason: SDUPTHER

## 2019-04-22 ASSESSMENT — PATIENT HEALTH QUESTIONNAIRE - PHQ9
2. FEELING DOWN, DEPRESSED OR HOPELESS: 0
SUM OF ALL RESPONSES TO PHQ QUESTIONS 1-9: 0
SUM OF ALL RESPONSES TO PHQ9 QUESTIONS 1 & 2: 0
SUM OF ALL RESPONSES TO PHQ QUESTIONS 1-9: 0
1. LITTLE INTEREST OR PLEASURE IN DOING THINGS: 0

## 2019-04-22 ASSESSMENT — ENCOUNTER SYMPTOMS: SHORTNESS OF BREATH: 0

## 2019-04-22 NOTE — PROGRESS NOTES
Patient ID: Neri Rodriguez 1950    . Chief Complaint   Patient presents with    Diabetes    Hypertension    Finger Pain     fingers locking up right hand    Medication Refill     out of medications         Diabetes   He presents for his initial diabetic visit. He has type 2 diabetes mellitus. His disease course has been fluctuating. Pertinent negatives for diabetes include no chest pain. Symptoms are stable. Diabetic complications include heart disease. Current diabetic treatment includes insulin injections. He is compliant with treatment some of the time (cancellled his last appt and ran out of medications). His weight is stable. He is following a generally healthy diet. When asked about meal planning, he reported none. He has not had a previous visit with a dietitian. His home blood glucose trend is fluctuating dramatically. (Did not bring his sugar list.  Range from 100- 300) An ACE inhibitor/angiotensin II receptor blocker is being taken. Hypertension   This is a chronic problem. Pertinent negatives include no chest pain, palpitations or shortness of breath. Past treatments include angiotensin blockers. The current treatment provides significant improvement. Gastroesophageal Reflux   He reports no chest pain. ran out of Celexa due to missing his last appt. is feeling more anxiet and would like to re-start it. This is a chronic problem. Risk factors include obesity (eats out a lot). He has tried a PPI for the symptoms. The treatment provided significant relief. Mental Health Problem   Primary symptoms comment: ran out of Celexa due to missing his last appt. is feeling more anxiet and would like to re-start it. This is a chronic problem. The onset of the illness is precipitated by emotional stress. Hand Pain    Incident onset: fingers right hand get stiff and stuck at times. Pertinent negatives include no chest pain.        Review of Systems   Respiratory: Negative for shortness of breath. Cardiovascular: Negative for chest pain and palpitations.        Patient Active Problem List   Diagnosis    Angina, class II ([address]    Abnormal nuclear cardiac imaging test    History of PTCA    Essential hypertension    Hyperlipidemia, mixed    Gastroesophageal reflux disease without esophagitis    [facility]    Diabetes mellitus ([facility]       Past Medical History:   Diagnosis Date    Arthritis     CAD (coronary artery disease)     Diabetes mellitus ([facility]     Hx of Doppler ultrasound 05/15/2018    Carotid-mild 0-49% bilateral    Hyperlipidemia     Hypertension     MDRO (multiple drug resistant organisms) resistance     MRSA in Rt elbow 11/13       Past Surgical History:   Procedure Laterality Date    ADENOIDECTOMY      CARDIAC CATHETERIZATION      Heart cath with stent placed in back side of heart    COLONOSCOPY  02/25/2014    TOTAL KNEE ARTHROPLASTY Right 12/2015    Right knee replaced    TOTAL KNEE ARTHROPLASTY Left 07/2014    Left knee replaced    VEIN SURGERY Right 2015    Vein stripped in right leg       Family History   Problem Relation Age of Onset    Osteoarthritis Mother     Rheumatologic Disease Mother         Raynaud's/ Crest     Heart Disease Father     Asthma Father     Diabetes Father     Heart Failure Father     Seizures Brother     Cancer Maternal Aunt     Stroke Maternal Uncle     Diabetes Paternal Aunt     High Cholesterol Paternal [de-identified]     Rashes/Skin Problems Maternal Grandmother     Stroke Maternal Grandmother     Diabetes Paternal Grandmother     Heart Failure Paternal Grandfather        Current Outpatient Medications on File Prior to Visit   Medication Sig Dispense Refill    insulin 70-30 (NOVOLIN 70/30) (70-30) 100 UNIT per ML injection vial 35 units in am and 25 units in evening 2 vial 2    rosuvastatin (CRESTOR) 20 MG tablet Take 1 tablet by mouth daily 30 tablet 2    losartan (COZAAR) 25 MG tablet Take 1 tablet by mouth nightly 30 tablet 2    Insulin Syringe-Needle U-100 ([employer] INSULIN SYR 1CC/30G) 30G X 5/16\" 1 ML MISC 1 each by Does not apply route 2 times daily 100 each 11    esomeprazole (NEXIUM) 40 MG delayed release capsule Take 1 capsule by mouth daily 30 capsule 11    glucose blood VI test strips (SVETA CONTOUR NEXT TEST) strip 1 each by In Vitro route 2 times daily As needed.  Omega 3-6-9 Fatty Acids (OMEGA 3-6-9 COMPLEX PO) Take by mouth      glucose ([employer] GLUCOSE) 4 G chewable tablet Take 4 tablets by mouth as needed for Low blood sugar (Patient taking differently: Take 4 tablets by mouth as needed for Low blood sugar) 60 tablet 0    Cinnamon 500 MG CAPS Take 500 mg by mouth 2 times daily.  Multiple Vitamins-Minerals (MULTIVITAMIN PO) Take 1 tablet by mouth.  aspirin 81 MG tablet Take 81 mg by mouth daily.  esomeprazole Magnesium (NEXIUM) 20 MG PACK Take 20 mg by mouth daily      citalopram (CELEXA) 40 MG tablet Take 1 tablet by mouth daily 30 tablet 11     No current facility-administered medications on file prior to visit. Objective:     Physical Exam   Constitutional: He is oriented to person, place, and time. He appears well-developed and well-nourished. No distress. HENT:   Head: Normocephalic. Neck: No tracheal deviation present. No thyromegaly present. Cardiovascular: Normal rate, regular rhythm, S1 normal, S2 normal and intact distal pulses. Exam reveals no gallop. No murmur heard. Pulses:       Dorsalis pedis pulses are 2+ on the right side, and 2+ on the left side. Posterior tibial pulses are 2+ on the right side, and 2+ on the left side. Carotids without bruits   Pulmonary/Chest: Effort normal and breath sounds normal. No respiratory distress. He has no wheezes. He has no rales. Abdominal: Soft. Normal aorta. He exhibits no distension and no mass. There is no tenderness. Musculoskeletal: He exhibits no edema.         Right foot: There is normal range of motion and no deformity. Left foot: There is normal range of motion and no deformity. Feet without lesions     Feet:   Right Foot:   Protective Sensation: 5 sites tested. 5 sites sensed. Skin Integrity: Negative for ulcer, blister, skin breakdown, erythema, warmth, callus or dry skin. Left Foot:   Protective Sensation: 5 sites tested. 5 sites sensed. Skin Integrity: Negative for ulcer, blister, skin breakdown, erythema, warmth, callus or dry skin. Neurological: He is alert and oriented to person, place, and time. Skin: Skin is warm, dry and intact. Psychiatric: He has a normal mood and affect. His behavior is normal. Judgment and thought content normal.   Nursing note and vitals reviewed. Vitals:    04/22/19 1607   BP: 120/70   Pulse: 69   Weight: 218 lb 6.4 oz (99.1 kg)   Height: 5' 8\" (1.727 m)     Body mass index is 33.21 kg/m². Wt Readings from Last 3 Encounters:   04/22/19 218 lb 6.4 oz (99.1 kg)   01/04/19 212 lb 6.4 oz (96.3 kg)   12/20/18 218 lb 6.4 oz (99.1 kg)     BP Readings from Last 3 Encounters:   04/22/19 120/70   01/04/19 132/69   12/20/18 132/70      Results for orders placed or performed in visit on 04/22/19   POCT glycosylated hemoglobin (Hb A1C)   Result Value Ref Range    Hemoglobin A1C 8.7 %         No results found for this visit on 04/22/19. The ASCVD Risk score (Jo Brito, et al., 2013) failed to calculate for the following reasons: The patient has a prior MI or stroke diagnosis  Lab Review   No visits with results within 2 Month(s) from this visit. Latest known visit with results is:   Office Visit on 12/20/2018   Component Date Value    Hemoglobin A1C 12/20/2018 7.5            Assessment:       Diagnosis Orders   1.  Type 2 diabetes mellitus with complication, with long-term current use of insulin ([facility])  POCT glycosylated hemoglobin (Hb A1C)    Insulin Syringe-Needle U-100 ([employer] INSULIN SYR 1CC/30G) 30G X 5/16\" 1 ML MISC

## 2019-04-23 ENCOUNTER — HOSPITAL ENCOUNTER (OUTPATIENT)
Age: 69
Setting detail: SPECIMEN
Discharge: HOME | End: 2019-04-23
Payer: COMMERCIAL

## 2019-04-23 PROBLEM — F41.9 ANXIETY: Status: ACTIVE | Noted: 2019-04-23

## 2019-04-23 PROCEDURE — 82043 UR ALBUMIN QUANTITATIVE: CPT

## 2019-04-23 PROCEDURE — 82570 ASSAY OF URINE CREATININE: CPT

## 2019-04-24 LAB
CREATININE URINE: 32.5 MG/DL (ref 39–259)
MICROALBUMIN/CREAT 24H UR: <1.2 MG/DL
MICROALBUMIN/CREAT UR-RTO: ABNORMAL MG/G CREAT (ref 0–30)

## 2019-04-30 ENCOUNTER — OFFICE VISIT (OUTPATIENT)
Dept: FAMILY MEDICINE CLINIC | Age: 69
End: 2019-04-30
Payer: COMMERCIAL

## 2019-04-30 VITALS
DIASTOLIC BLOOD PRESSURE: 60 MMHG | WEIGHT: 220.4 LBS | HEART RATE: 62 BPM | BODY MASS INDEX: 36.72 KG/M2 | HEIGHT: 65 IN | SYSTOLIC BLOOD PRESSURE: 110 MMHG

## 2019-04-30 DIAGNOSIS — Z23 NEED FOR PROPHYLACTIC VACCINATION AGAINST STREPTOCOCCUS PNEUMONIAE (PNEUMOCOCCUS): ICD-10-CM

## 2019-04-30 DIAGNOSIS — Z00.00 MEDICARE ANNUAL WELLNESS VISIT, INITIAL: ICD-10-CM

## 2019-04-30 DIAGNOSIS — Z00.00 ROUTINE GENERAL MEDICAL EXAMINATION AT A HEALTH CARE FACILITY: ICD-10-CM

## 2019-04-30 DIAGNOSIS — R23.8 BENIGN PAPULE: Primary | ICD-10-CM

## 2019-04-30 PROCEDURE — 90732 PPSV23 VACC 2 YRS+ SUBQ/IM: CPT | Performed by: FAMILY MEDICINE

## 2019-04-30 PROCEDURE — G8598 ASA/ANTIPLAT THER USED: HCPCS | Performed by: FAMILY MEDICINE

## 2019-04-30 PROCEDURE — 4040F PNEUMOC VAC/ADMIN/RCVD: CPT | Performed by: FAMILY MEDICINE

## 2019-04-30 PROCEDURE — G0438 PPPS, INITIAL VISIT: HCPCS | Performed by: FAMILY MEDICINE

## 2019-04-30 PROCEDURE — 1123F ACP DISCUSS/DSCN MKR DOCD: CPT | Performed by: FAMILY MEDICINE

## 2019-04-30 PROCEDURE — G0447 BEHAVIOR COUNSEL OBESITY 15M: HCPCS | Performed by: FAMILY MEDICINE

## 2019-04-30 PROCEDURE — 3017F COLORECTAL CA SCREEN DOC REV: CPT | Performed by: FAMILY MEDICINE

## 2019-04-30 PROCEDURE — 90471 IMMUNIZATION ADMIN: CPT | Performed by: FAMILY MEDICINE

## 2019-04-30 ASSESSMENT — ANXIETY QUESTIONNAIRES: GAD7 TOTAL SCORE: 1

## 2019-04-30 ASSESSMENT — LIFESTYLE VARIABLES: HOW OFTEN DO YOU HAVE A DRINK CONTAINING ALCOHOL: 0

## 2019-04-30 ASSESSMENT — PATIENT HEALTH QUESTIONNAIRE - PHQ9
SUM OF ALL RESPONSES TO PHQ QUESTIONS 1-9: 0
SUM OF ALL RESPONSES TO PHQ QUESTIONS 1-9: 0

## 2019-04-30 NOTE — PATIENT INSTRUCTIONS
Body Mass Index: Care Instructions  Your Care Instructions    Body mass index (BMI) can help you see if your weight is raising your risk for health problems. It uses a formula to compare how much you weigh with how tall you are. · A BMI lower than 18.5 is considered underweight. · A BMI between 18.5 and 24.9 is considered healthy. · A BMI between 25 and 29.9 is considered overweight. A BMI of 30 or higher is considered obese. If your BMI is in the normal range, it means that you have a lower risk for weight-related health problems. If your BMI is in the overweight or obese range, you may be at increased risk for weight-related health problems, such as high blood pressure, heart disease, stroke, arthritis or joint pain, and diabetes. If your BMI is in the underweight range, you may be at increased risk for health problems such as fatigue, lower protection (immunity) against illness, muscle loss, bone loss, hair loss, and hormone problems. BMI is just one measure of your risk for weight-related health problems. You may be at higher risk for health problems if you are not active, you eat an unhealthy diet, or you drink too much alcohol or use tobacco products. Follow-up care is a key part of your treatment and safety. Be sure to make and go to all appointments, and call your doctor if you are having problems. It's also a good idea to know your test results and keep a list of the medicines you take. How can you care for yourself at home? · Practice healthy eating habits. This includes eating plenty of fruits, vegetables, whole grains, lean protein, and low-fat dairy. · If your doctor recommends it, get more exercise. Walking is a good choice. Bit by bit, increase the amount you walk every day. Try for at least 30 minutes on most days of the week. · Do not smoke. Smoking can increase your risk for health problems. If you need help quitting, talk to your doctor about stop-smoking programs and medicines. and/or copay. Some of these benefits include a comprehensive review of your medical history including lifestyle, illnesses that may run in your family, and various assessments and screenings as appropriate. After reviewing your medical record and screening and assessments performed today your provider may have ordered immunizations, labs, imaging, and/or referrals for you. A list of these orders (if applicable) as well as your Preventive Care list are included within your After Visit Summary for your review. Other Preventive Recommendations:    · A preventive eye exam performed by an eye specialist is recommended every 1-2 years to screen for glaucoma; cataracts, macular degeneration, and other eye disorders. · A preventive dental visit is recommended every 6 months. · Try to get at least 150 minutes of exercise per week or 10,000 steps per day on a pedometer . · Order or download the FREE \"Exercise & Physical Activity: Your Everyday Guide\" from [employer]. Call 4-281.302.3197 or search [employer] online. · You need 9094-5083 mg of calcium and 1652-5614 IU of vitamin D per day. It is possible to meet your calcium requirement with diet alone, but a vitamin D supplement is usually necessary to meet this goal.  · When exposed to the sun, use a sunscreen that protects against both UVA and UVB radiation with an SPF of 30 or greater. Reapply every 2 to 3 hours or after sweating, drying off with a towel, or swimming. · Always wear a seat belt when traveling in a car. Always wear a helmet when riding a bicycle or motorcycle.

## 2019-05-10 ENCOUNTER — HOSPITAL ENCOUNTER (EMERGENCY)
Age: 69
Discharge: HOME | End: 2019-05-10
Attending: EMERGENCY MEDICINE
Payer: COMMERCIAL

## 2019-05-10 VITALS
RESPIRATION RATE: 16 BRPM | HEIGHT: 65 IN | BODY MASS INDEX: 36.65 KG/M2 | OXYGEN SATURATION: 100 % | TEMPERATURE: 97.5 F | WEIGHT: 220 LBS | HEART RATE: 67 BPM | SYSTOLIC BLOOD PRESSURE: 138 MMHG | DIASTOLIC BLOOD PRESSURE: 64 MMHG

## 2019-05-10 DIAGNOSIS — E16.2 HYPOGLYCEMIA: Primary | ICD-10-CM

## 2019-05-10 LAB
ALBUMIN SERPL-MCNC: 3.9 GM/DL (ref 3.4–5)
ALP BLD-CCNC: 72 IU/L (ref 40–128)
ALT SERPL-CCNC: 15 U/L (ref 10–40)
ANION GAP SERPL CALCULATED.3IONS-SCNC: 13 MMOL/L (ref 4–16)
AST SERPL-CCNC: 17 IU/L (ref 15–37)
BASOPHILS ABSOLUTE: 0.1 K/CU MM
BASOPHILS RELATIVE PERCENT: 0.6 % (ref 0–1)
BILIRUB SERPL-MCNC: 0.2 MG/DL (ref 0–1)
BUN BLDV-MCNC: 22 MG/DL (ref 6–23)
CALCIUM SERPL-MCNC: 9 MG/DL (ref 8.3–10.6)
CHLORIDE BLD-SCNC: 97 MMOL/L (ref 99–110)
CO2: 27 MMOL/L (ref 21–32)
CREAT SERPL-MCNC: 1 MG/DL (ref 0.9–1.3)
DIFFERENTIAL TYPE: ABNORMAL
EOSINOPHILS ABSOLUTE: 0 K/CU MM
EOSINOPHILS RELATIVE PERCENT: 0.3 % (ref 0–3)
GFR AFRICAN AMERICAN: >60 ML/MIN/1.73M2
GFR NON-AFRICAN AMERICAN: >60 ML/MIN/1.73M2
GLUCOSE BLD-MCNC: 109 MG/DL (ref 70–99)
GLUCOSE BLD-MCNC: 118 MG/DL
GLUCOSE BLD-MCNC: 118 MG/DL (ref 70–99)
GLUCOSE BLD-MCNC: 97 MG/DL (ref 70–99)
HCT VFR BLD CALC: 42.5 % (ref 42–52)
HEMOGLOBIN: 13.4 GM/DL (ref 13.5–18)
IMMATURE NEUTROPHIL %: 0.4 % (ref 0–0.43)
LYMPHOCYTES ABSOLUTE: 0.9 K/CU MM
LYMPHOCYTES RELATIVE PERCENT: 7.7 % (ref 24–44)
MCH RBC QN AUTO: 28.5 PG (ref 27–31)
MCHC RBC AUTO-ENTMCNC: 31.5 % (ref 32–36)
MCV RBC AUTO: 90.2 FL (ref 78–100)
MONOCYTES ABSOLUTE: 0.9 K/CU MM
MONOCYTES RELATIVE PERCENT: 7.3 % (ref 0–4)
NUCLEATED RBC %: 0 %
PDW BLD-RTO: 12.9 % (ref 11.7–14.9)
PLATELET # BLD: 257 K/CU MM (ref 140–440)
PMV BLD AUTO: 9.3 FL (ref 7.5–11.1)
POTASSIUM SERPL-SCNC: 3.9 MMOL/L (ref 3.5–5.1)
RBC # BLD: 4.71 M/CU MM (ref 4.6–6.2)
SEGMENTED NEUTROPHILS ABSOLUTE COUNT: 10.3 K/CU MM
SEGMENTED NEUTROPHILS RELATIVE PERCENT: 83.7 % (ref 36–66)
SODIUM BLD-SCNC: 137 MMOL/L (ref 135–145)
TOTAL IMMATURE NEUTOROPHIL: 0.05 K/CU MM
TOTAL NUCLEATED RBC: 0 K/CU MM
TOTAL PROTEIN: 7.1 GM/DL (ref 6.4–8.2)
WBC # BLD: 12.3 K/CU MM (ref 4–10.5)

## 2019-05-10 PROCEDURE — 99285 EMERGENCY DEPT VISIT HI MDM: CPT

## 2019-05-10 PROCEDURE — 85025 COMPLETE CBC W/AUTO DIFF WBC: CPT

## 2019-05-10 PROCEDURE — 36415 COLL VENOUS BLD VENIPUNCTURE: CPT

## 2019-05-10 PROCEDURE — 82962 GLUCOSE BLOOD TEST: CPT

## 2019-05-10 PROCEDURE — 80053 COMPREHEN METABOLIC PANEL: CPT

## 2019-05-10 NOTE — ED NOTES
Pt sitting up in bed, a/o, denies any needs at this time. Call light within reach.       Geoff Heart RN  05/10/19 [id number]

## 2019-05-10 NOTE — ED PROVIDER NOTES
Triage Chief Complaint:   Hypoglycemia    [place]:  Catarino Ormond is a 79 y.o. male that presents with low blood sugar. Patient was in baseline state of health until this early afternoon when he was found stopped at a stoplight holding a traffic very confused. Patient did not recognize his own grandson. EMS was called and patient on behalf of hypoglycemic to 35s. Patient was given oral glucose gel and brought to the emergency department. On arrival to the ED patient is reporting feeling better. No somatic complaints. This is the 2nd time that this is occurred while patient has been driving. Family reports the patient forgets to eat after taking his insulin and did just work extended shifts both yesterday and today without eating much this morning. Patient does not recall exactly what happens and \"blacked out\". No recent change in insulin. Patient has not been ill at all recently. Patient's primary care physician doesn't prescribe his insulin. Family reports patient is back to baseline.     ROS:  General:  No fevers, no chills, no weakness  Eyes:  No recent vison changes, no discharge  ENT:  No sore throat, no nasal congestion, no hearing changes  Cardiovascular:  No chest pain, no palpitations  Respiratory:  No shortness of breath, no cough, no wheezing  Gastrointestinal:  No pain, no nausea, no vomiting, no diarrhea  Musculoskeletal:  No muscle pain, no joint pain  Skin:  No rash, no pruritis, no easy bruising  Neurologic:  No speech problems, no headache, no extremity numbness, no extremity tingling, no extremity weakness  Psychiatric:  No anxiety  Genitourinary:  No dysuria, no hematuria  Endocrine:  No unexpected weight gain, no unexpected weight loss  Extremities:  no edema, no pain    Past Medical History:   Diagnosis Date    Arthritis     CAD (coronary artery disease)     Diabetes mellitus ([facility] [address]     Hx of Doppler ultrasound 05/15/2018    Carotid-mild 0-49% bilateral    Hyperlipidemia     Hypertension     MDRO (multiple drug resistant organisms) resistance     MRSA in Rt elbow 11/13     Past Surgical History:   Procedure Laterality Date    ADENOIDECTOMY      CARDIAC CATHETERIZATION      Heart cath with stent placed in back side of heart    COLONOSCOPY  02/25/2014    TOTAL KNEE ARTHROPLASTY Right 12/2015    Right knee replaced    TOTAL KNEE ARTHROPLASTY Left 07/2014    Left knee replaced    VEIN SURGERY Right 2015    Vein stripped in right leg     Family History   Problem Relation Age of Onset    Osteoarthritis Mother     Rheumatologic Disease Mother         Raynaud's/ Crest     Heart Disease Father     Asthma Father     Diabetes Father     Heart Failure Father     Seizures Brother     Cancer Maternal Aunt     Stroke Maternal Uncle     Diabetes Paternal Aunt     High Cholesterol Paternal Aunt     Rashes/Skin Problems Maternal Grandmother     Stroke Maternal Grandmother     Diabetes Paternal Grandmother     Heart Failure Paternal Grandfather      Social History     Socioeconomic History    Marital status:      Spouse name: Not on file    Number of children: Not on file    Years of education: Not on file    Highest education level: Not on file   Occupational History    Not on file   Social Needs    Financial resource strain: Not on file    Food insecurity:     Worry: Not on file     Inability: Not on file    Transportation needs:     Medical: Not on file     Non-medical: Not on file   Tobacco Use    Smoking status: Never Smoker    Smokeless tobacco: Never Used   Substance and Sexual Activity    Alcohol use: No    Drug use: No    Sexual activity: Not on file   Lifestyle    Physical activity:     Days per week: Not on file     Minutes per session: Not on file    Stress: Not on file   Relationships    Social connections:     Talks on phone: Not on file     Gets together: Not on file     Attends Yarsani service: Not on file     Active member of club or organization: Not on file     Attends meetings of clubs or organizations: Not on file     Relationship status: Not on file    Intimate partner violence:     Fear of current or ex partner: Not on file     Emotionally abused: Not on file     Physically abused: Not on file     Forced sexual activity: Not on file   Other Topics Concern    Not on file   Social History Narrative    Not on file     No current facility-administered medications for this encounter. Current Outpatient Medications   Medication Sig Dispense Refill    rosuvastatin (CRESTOR) 20 MG tablet Take 1 tablet by mouth daily 30 tablet 2    citalopram (CELEXA) 40 MG tablet Take 1 tablet by mouth daily 30 tablet 11    esomeprazole (NEXIUM) 40 MG delayed release capsule Take 1 capsule by mouth daily 30 capsule 11    Insulin Syringe-Needle U-100 ([employer] INSULIN SYR 1CC/30G) 30G X 5/16\" 1 ML MISC 1 each by Does not apply route 2 times daily 100 each 11    losartan (COZAAR) 25 MG tablet Take 1 tablet by mouth nightly 30 tablet 2    insulin 70-30 (NOVOLIN 70/30) (70-30) 100 UNIT per ML injection vial 35 units in am and 25 units in evening 2 vial 2    esomeprazole Magnesium (NEXIUM) 20 MG PACK Take 20 mg by mouth daily      glucose blood VI test strips (SVETA CONTOUR NEXT TEST) strip 1 each by In Vitro route 2 times daily As needed.  Omega 3-6-9 Fatty Acids (OMEGA 3-6-9 COMPLEX PO) Take by mouth      glucose ([facility] GLUCOSE) 4 G chewable tablet Take 4 tablets by mouth as needed for Low blood sugar (Patient taking differently: Take 4 tablets by mouth as needed for Low blood sugar) 60 tablet 0    Cinnamon 500 MG CAPS Take 500 mg by mouth 2 times daily.  Multiple Vitamins-Minerals (MULTIVITAMIN PO) Take 1 tablet by mouth.  aspirin 81 MG tablet Take 81 mg by mouth daily.        Allergies   Allergen Reactions    Cymbalta [Duloxetine Hcl] Itching       Nursing Notes Reviewed    Physical Exam:  ED Triage Vitals   Enc Vitals Group BP 05/10/19 1514 138/64      Pulse 05/10/19 1404 60      Resp 05/10/19 1404 18      Temp 05/10/19 1514 97.5 °F (36.4 °C)      Temp Source 05/10/19 1514 Oral      SpO2 05/10/19 1404 98 %      Weight 05/10/19 1400 220 lb (99.8 kg)      Height 05/10/19 1400 5' 5\" (1.651 m)      Head Circumference --       Peak Flow --       Pain Score --       Pain Loc --       Pain Edu? --       Excl. in [address]? --        My pulse ox interpretation is - normal    General appearance:  No acute distress. Sitting comfortably in bed eating chris crackers. Appears well. Skin:  Warm. Dry. No diaphoresis. Eye:  Extraocular movements intact. Pupils equal round react to light. Ears, nose, mouth and throat:  Oral mucosa moist   Neck:  Trachea midline. Extremity:  No swelling. Normal ROM     Heart:  Regular rate and rhythm, normal S1 & S2, no extra heart sounds. Perfusion:  Intact   Respiratory:  Lungs clear to auscultation bilaterally. Respirations nonlabored. Speaking clearly in full sentences. No respiratory distress. Abdominal:  Normal bowel sounds. Soft. Nontender. Non distended. Back:  No CVA tenderness to palpation     Neurological:  Alert and oriented times 3. No focal neuro deficits.  Sensation intact to light touch to distal upper/lower extremities; 5/5 and symmetric shrug, , HF, KF/KE, and dorsi/plantar flexion; symmetric brow raise and nasolabial folds, tongue midline; FTN and NARCISO intact; 2+ and symmetric reflexes to bilateral upper/lower extremities; ambulates with steady gait; no dysarthria or aphasia            Psychiatric:  Appropriate    I have reviewed and interpreted all of the currently available lab results from this visit (if applicable):  Results for orders placed or performed during the hospital encounter of 05/10/19   CBC Auto Differential   Result Value Ref Range    WBC 12.3 (H) 4.0 - 10.5 K/CU MM    RBC 4.71 4.6 - 6.2 M/CU MM    Hemoglobin 13.4 (L) 13.5 - 18.0 GM/DL    Hematocrit 42.5 42 - 52 % MCV 90.2 78 - 100 FL    MCH 28.5 27 - 31 PG    MCHC 31.5 (L) 32.0 - 36.0 %    RDW 12.9 11.7 - 14.9 %    Platelets 429 755 - 641 K/CU MM    MPV 9.3 7.5 - 11.1 FL    Differential Type AUTOMATED DIFFERENTIAL     Segs Relative 83.7 (H) 36 - 66 %    Lymphocytes % 7.7 (L) 24 - 44 %    Monocytes % 7.3 (H) 0 - 4 %    Eosinophils % 0.3 0 - 3 %    Basophils % 0.6 0 - 1 %    Segs Absolute 10.3 K/CU MM    Lymphocytes # 0.9 K/CU MM    Monocytes # 0.9 K/CU MM    Eosinophils # 0.0 K/CU MM    Basophils # 0.1 K/CU MM    Nucleated RBC % 0.0 %    Total Nucleated RBC 0.0 K/CU MM    Total Immature Neutrophil 0.05 K/CU MM    Immature Neutrophil % 0.4 0 - 0.43 %   Comprehensive Metabolic Panel   Result Value Ref Range    Sodium 137 135 - 145 MMOL/L    Potassium 3.9 3.5 - 5.1 MMOL/L    Chloride 97 (L) 99 - 110 mMol/L    CO2 27 21 - 32 MMOL/L    BUN 22 6 - 23 MG/DL    CREATININE 1.0 0.9 - 1.3 MG/DL    Glucose 109 (H) 70 - 99 MG/DL    Calcium 9.0 8.3 - 10.6 MG/DL    Alb 3.9 3.4 - 5.0 GM/DL    Total Protein 7.1 6.4 - 8.2 GM/DL    Total Bilirubin 0.2 0.0 - 1.0 MG/DL    ALT 15 10 - 40 U/L    AST 17 15 - 37 IU/L    Alkaline Phosphatase 72 40 - 128 IU/L    GFR Non-African American >60 >60 mL/min/1.73m2    GFR African American >60 >60 mL/min/1.73m2    Anion Gap 13 4 - 16   POCT Glucose   Result Value Ref Range    POC Glucose 97 70 - 99 MG/DL   POCT glucose   Result Value Ref Range    Glucose 118 mg/dL   POCT Glucose   Result Value Ref Range    POC Glucose 118 (H) 70 - 99 MG/DL      Radiographs (if obtained):  [] The following radiograph was interpreted by myself in the absence of a radiologist:   [] Radiologist's Report Reviewed:  No orders to display         EKG (if obtained): (All EKG's are interpreted by myself in the absence of a cardiologist)    Chart review shows recent radiographs:  No results found. MDM:  Pt presents as above. Emergent conditions considered. Presentation prompted initial labs as above.  Point-of-care glucose on arrival with euglycemia. Patient was provided with snacks/food on arrival and was observed with continued increase of blood sugar and normal mentation. CMP with mild hyperglycemia. CBC without clinically significant derangement other than small leukocytosis. Patient with good reason to be hypoglycemic as he did not eat a normal meal this morning while at work and did take his insulin. Patient's altered mentation improved very quickly with normalization of his blood sugar which I do believe is her reason for his altered mental status prehospital. Patient is comfortable home-going this time with his wife who is a RN. Patient will be checking his blood sugar closely today. Family is aware of what signs and symptoms of what to watch for on when to return to the emergency department. Questions sought and answered with the patient. They voice understanding and agree with plan. Instructed to return for any worsening or worrisome concerns. Clinical Impression:  1. Hypoglycemia      Disposition referral (if applicable):  Shae Poole MD  [address] [place] [address]  [phone]    Schedule an appointment as soon as possible for a visit       [facility] Emergency Department  [address] [facility] [id number] [phone]  Today  If symptoms worsen    Disposition medications (if applicable):  Discharge Medication List as of 5/10/2019  4:02 PM          Comment: Please note this report has been produced using speech recognition software and may contain errors related to that system including errors in grammar, punctuation, and spelling, as well as words and phrases that may be inappropriate. If there are any questions or concerns please feel free to contact the dictating provider for clarification.        Edna Sullivan MD  05/10/19 3030

## 2019-05-10 NOTE — ED NOTES
Pt to ED via EMS for hypoglycemia. Per EMS, pt BG was in 30's and 40's. Pt was given Oral glucose by EMS. Pt BG upon arrival to ED was 97. Pt alert and oriented x 4. Pt given peanut butter and crackers and milk.       Ana María Zuniga RN  05/10/19 2218

## 2019-07-15 ENCOUNTER — OFFICE VISIT (OUTPATIENT)
Dept: FAMILY MEDICINE CLINIC | Age: 69
End: 2019-07-15
Payer: COMMERCIAL

## 2019-07-15 VITALS
BODY MASS INDEX: 33.56 KG/M2 | DIASTOLIC BLOOD PRESSURE: 80 MMHG | HEART RATE: 56 BPM | WEIGHT: 221.4 LBS | SYSTOLIC BLOOD PRESSURE: 122 MMHG | HEIGHT: 68 IN

## 2019-07-15 DIAGNOSIS — F41.9 ANXIETY: ICD-10-CM

## 2019-07-15 DIAGNOSIS — E78.5 HYPERLIPIDEMIA, UNSPECIFIED HYPERLIPIDEMIA TYPE: ICD-10-CM

## 2019-07-15 DIAGNOSIS — I10 ESSENTIAL HYPERTENSION: Chronic | ICD-10-CM

## 2019-07-15 DIAGNOSIS — Z79.4 TYPE 2 DIABETES MELLITUS WITH COMPLICATION, WITH LONG-TERM CURRENT USE OF INSULIN (HCC): Primary | ICD-10-CM

## 2019-07-15 DIAGNOSIS — E11.8 TYPE 2 DIABETES MELLITUS WITH COMPLICATION, WITH LONG-TERM CURRENT USE OF INSULIN (HCC): Primary | ICD-10-CM

## 2019-07-15 LAB — HBA1C MFR BLD: 7.2 %

## 2019-07-15 PROCEDURE — 4040F PNEUMOC VAC/ADMIN/RCVD: CPT | Performed by: FAMILY MEDICINE

## 2019-07-15 PROCEDURE — 3045F PR MOST RECENT HEMOGLOBIN A1C LEVEL 7.0-9.0%: CPT | Performed by: FAMILY MEDICINE

## 2019-07-15 PROCEDURE — G8598 ASA/ANTIPLAT THER USED: HCPCS | Performed by: FAMILY MEDICINE

## 2019-07-15 PROCEDURE — 2022F DILAT RTA XM EVC RTNOPTHY: CPT | Performed by: FAMILY MEDICINE

## 2019-07-15 PROCEDURE — 3017F COLORECTAL CA SCREEN DOC REV: CPT | Performed by: FAMILY MEDICINE

## 2019-07-15 PROCEDURE — 1123F ACP DISCUSS/DSCN MKR DOCD: CPT | Performed by: FAMILY MEDICINE

## 2019-07-15 PROCEDURE — G8427 DOCREV CUR MEDS BY ELIG CLIN: HCPCS | Performed by: FAMILY MEDICINE

## 2019-07-15 PROCEDURE — 83036 HEMOGLOBIN GLYCOSYLATED A1C: CPT | Performed by: FAMILY MEDICINE

## 2019-07-15 PROCEDURE — 1036F TOBACCO NON-USER: CPT | Performed by: FAMILY MEDICINE

## 2019-07-15 PROCEDURE — 99214 OFFICE O/P EST MOD 30 MIN: CPT | Performed by: FAMILY MEDICINE

## 2019-07-15 PROCEDURE — G8417 CALC BMI ABV UP PARAM F/U: HCPCS | Performed by: FAMILY MEDICINE

## 2019-07-15 RX ORDER — LOSARTAN POTASSIUM 25 MG/1
25 TABLET ORAL NIGHTLY
Qty: 30 TABLET | Refills: 2 | Status: SHIPPED | OUTPATIENT
Start: 2019-07-15 | End: 2019-10-09 | Stop reason: SDUPTHER

## 2019-07-15 RX ORDER — ROSUVASTATIN CALCIUM 20 MG/1
20 TABLET, COATED ORAL DAILY
Qty: 30 TABLET | Refills: 2 | Status: SHIPPED | OUTPATIENT
Start: 2019-07-15 | End: 2019-10-09 | Stop reason: SDUPTHER

## 2019-07-15 ASSESSMENT — ENCOUNTER SYMPTOMS: SHORTNESS OF BREATH: 0

## 2019-07-15 NOTE — PATIENT INSTRUCTIONS
Walking for Exercise: Care Instructions  Your Care Instructions    Walking is one of the easiest ways to get the exercise you need for good health. A brisk, 30-minute walk each day can help you feel better and have more energy. It can help you lower your risk of disease. Walking can help you keep your bones strong and your heart healthy. Check with your doctor before you start a walking plan if you have heart problems, other health issues, or you have not been active in a long time. Follow your doctor's instructions for safe levels of exercise. Follow-up care is a key part of your treatment and safety. Be sure to make and go to all appointments, and call your doctor if you are having problems. It's also a good idea to know your test results and keep a list of the medicines you take. How can you care for yourself at home? Getting started  · Start slowly and set a short-term goal. For example, walk for 5 or 10 minutes every day. · Bit by bit, increase the amount you walk every day. Try for at least 30 minutes on most days of the week. You also may want to swim, bike, or do other activities. · If finding enough time is a problem, it is fine to be active in blocks of 10 minutes or more throughout your day and week. · To get the heart-healthy benefits of walking, you need to walk briskly enough to increase your heart rate and breathing, but not so fast that you cannot talk comfortably. · Wear comfortable shoes that fit well and provide good support for your feet and ankles. Staying with your plan  · After you've made walking a habit, set a longer-term goal. You may want to set a goal of walking briskly for longer or walking farther. Experts say to do 2½ hours of moderate activity a week. A faster heartbeat is what defines moderate-level activity. · To stay motivated, walk with friends, coworkers, or pets. · Use a phone rachna or pedometer to track your steps each day. Set a goal to increase your steps.  Once

## 2019-07-15 NOTE — PROGRESS NOTES
times daily.  Multiple Vitamins-Minerals (MULTIVITAMIN PO) Take 1 tablet by mouth.  aspirin 81 MG tablet Take 81 mg by mouth daily.  esomeprazole Magnesium (NEXIUM) 20 MG PACK Take 20 mg by mouth daily       No current facility-administered medications on file prior to visit. Objective:         Physical Exam   Constitutional: He appears well-developed and well-nourished. Obese   HENT:   Head: Normocephalic and atraumatic. Cardiovascular: Normal rate, regular rhythm, S1 normal, S2 normal and normal heart sounds. Pulmonary/Chest: Breath sounds normal. No respiratory distress. He has no wheezes. He has no rales. Neurological: He is alert. Skin: Skin is warm, dry and intact. Psychiatric: He has a normal mood and affect. His speech is normal and behavior is normal. Cognition and memory are normal.   Nursing note and vitals reviewed. Vitals:    07/15/19 1606   BP: 122/80   Site: Left Upper Arm   Position: Sitting   Cuff Size: Large Adult   Pulse: 56   Weight: 221 lb 6.4 oz (100.4 kg)   Height: 5' 7.5\" (1.715 m)     Body mass index is 34.16 kg/m². Wt Readings from Last 3 Encounters:   07/15/19 221 lb 6.4 oz (100.4 kg)   05/10/19 220 lb (99.8 kg)   04/30/19 220 lb 6.4 oz (100 kg)     BP Readings from Last 3 Encounters:   07/15/19 122/80   05/10/19 138/64   04/30/19 110/60          Results for orders placed or performed in visit on 07/15/19   POCT glycosylated hemoglobin (Hb A1C)   Result Value Ref Range    Hemoglobin A1C 7.2 %     The ASCVD Risk score (Roma Duran et al., 2013) failed to calculate for the following reasons:     The patient has a prior MI or stroke diagnosis  Lab Review   Admission on 05/10/2019, Discharged on 05/10/2019   Component Date Value    POC Glucose 05/10/2019 97     WBC 05/10/2019 12.3*    RBC 05/10/2019 4.71     Hemoglobin 05/10/2019 13.4*    Hematocrit 05/10/2019 42.5     MCV 05/10/2019 90.2     MCH 05/10/2019 28.5     MCHC

## 2019-10-09 ENCOUNTER — OFFICE VISIT (OUTPATIENT)
Dept: FAMILY MEDICINE CLINIC | Age: 69
End: 2019-10-09
Payer: COMMERCIAL

## 2019-10-09 VITALS
HEIGHT: 68 IN | HEART RATE: 70 BPM | SYSTOLIC BLOOD PRESSURE: 110 MMHG | DIASTOLIC BLOOD PRESSURE: 60 MMHG | WEIGHT: 220.4 LBS | BODY MASS INDEX: 33.4 KG/M2

## 2019-10-09 DIAGNOSIS — F41.9 ANXIETY: ICD-10-CM

## 2019-10-09 DIAGNOSIS — Z23 NEEDS FLU SHOT: ICD-10-CM

## 2019-10-09 DIAGNOSIS — E11.8 TYPE 2 DIABETES MELLITUS WITH COMPLICATION, WITH LONG-TERM CURRENT USE OF INSULIN (HCC): Primary | ICD-10-CM

## 2019-10-09 DIAGNOSIS — I10 ESSENTIAL HYPERTENSION: ICD-10-CM

## 2019-10-09 DIAGNOSIS — Z79.4 TYPE 2 DIABETES MELLITUS WITH COMPLICATION, WITH LONG-TERM CURRENT USE OF INSULIN (HCC): Primary | ICD-10-CM

## 2019-10-09 DIAGNOSIS — E78.5 HYPERLIPIDEMIA, UNSPECIFIED HYPERLIPIDEMIA TYPE: ICD-10-CM

## 2019-10-09 LAB — HBA1C MFR BLD: 7.6 %

## 2019-10-09 PROCEDURE — 83036 HEMOGLOBIN GLYCOSYLATED A1C: CPT | Performed by: FAMILY MEDICINE

## 2019-10-09 PROCEDURE — G8598 ASA/ANTIPLAT THER USED: HCPCS | Performed by: FAMILY MEDICINE

## 2019-10-09 PROCEDURE — G8427 DOCREV CUR MEDS BY ELIG CLIN: HCPCS | Performed by: FAMILY MEDICINE

## 2019-10-09 PROCEDURE — G8482 FLU IMMUNIZE ORDER/ADMIN: HCPCS | Performed by: FAMILY MEDICINE

## 2019-10-09 PROCEDURE — 3045F PR MOST RECENT HEMOGLOBIN A1C LEVEL 7.0-9.0%: CPT | Performed by: FAMILY MEDICINE

## 2019-10-09 PROCEDURE — 2022F DILAT RTA XM EVC RTNOPTHY: CPT | Performed by: FAMILY MEDICINE

## 2019-10-09 PROCEDURE — 1123F ACP DISCUSS/DSCN MKR DOCD: CPT | Performed by: FAMILY MEDICINE

## 2019-10-09 PROCEDURE — G8417 CALC BMI ABV UP PARAM F/U: HCPCS | Performed by: FAMILY MEDICINE

## 2019-10-09 PROCEDURE — 90471 IMMUNIZATION ADMIN: CPT | Performed by: FAMILY MEDICINE

## 2019-10-09 PROCEDURE — 1036F TOBACCO NON-USER: CPT | Performed by: FAMILY MEDICINE

## 2019-10-09 PROCEDURE — 99214 OFFICE O/P EST MOD 30 MIN: CPT | Performed by: FAMILY MEDICINE

## 2019-10-09 PROCEDURE — 4040F PNEUMOC VAC/ADMIN/RCVD: CPT | Performed by: FAMILY MEDICINE

## 2019-10-09 PROCEDURE — 3017F COLORECTAL CA SCREEN DOC REV: CPT | Performed by: FAMILY MEDICINE

## 2019-10-09 PROCEDURE — 90653 IIV ADJUVANT VACCINE IM: CPT | Performed by: FAMILY MEDICINE

## 2019-10-09 RX ORDER — ROSUVASTATIN CALCIUM 20 MG/1
20 TABLET, COATED ORAL DAILY
Qty: 30 TABLET | Refills: 2 | Status: SHIPPED | OUTPATIENT
Start: 2019-10-09 | End: 2020-01-09 | Stop reason: SDUPTHER

## 2019-10-09 RX ORDER — LOSARTAN POTASSIUM 25 MG/1
25 TABLET ORAL NIGHTLY
Qty: 30 TABLET | Refills: 2 | Status: SHIPPED | OUTPATIENT
Start: 2019-10-09 | End: 2020-01-09 | Stop reason: SDUPTHER

## 2019-10-09 ASSESSMENT — ENCOUNTER SYMPTOMS: SHORTNESS OF BREATH: 0

## 2020-01-09 ENCOUNTER — OFFICE VISIT (OUTPATIENT)
Dept: FAMILY MEDICINE CLINIC | Age: 70
End: 2020-01-09
Payer: COMMERCIAL

## 2020-01-09 VITALS
SYSTOLIC BLOOD PRESSURE: 120 MMHG | HEART RATE: 75 BPM | BODY MASS INDEX: 35 KG/M2 | WEIGHT: 223 LBS | HEIGHT: 67 IN | DIASTOLIC BLOOD PRESSURE: 78 MMHG

## 2020-01-09 LAB — HBA1C MFR BLD: 7.7 %

## 2020-01-09 PROCEDURE — 2022F DILAT RTA XM EVC RTNOPTHY: CPT | Performed by: FAMILY MEDICINE

## 2020-01-09 PROCEDURE — G8417 CALC BMI ABV UP PARAM F/U: HCPCS | Performed by: FAMILY MEDICINE

## 2020-01-09 PROCEDURE — 1036F TOBACCO NON-USER: CPT | Performed by: FAMILY MEDICINE

## 2020-01-09 PROCEDURE — 99214 OFFICE O/P EST MOD 30 MIN: CPT | Performed by: FAMILY MEDICINE

## 2020-01-09 PROCEDURE — G8482 FLU IMMUNIZE ORDER/ADMIN: HCPCS | Performed by: FAMILY MEDICINE

## 2020-01-09 PROCEDURE — G8427 DOCREV CUR MEDS BY ELIG CLIN: HCPCS | Performed by: FAMILY MEDICINE

## 2020-01-09 PROCEDURE — 83036 HEMOGLOBIN GLYCOSYLATED A1C: CPT | Performed by: FAMILY MEDICINE

## 2020-01-09 PROCEDURE — 3017F COLORECTAL CA SCREEN DOC REV: CPT | Performed by: FAMILY MEDICINE

## 2020-01-09 PROCEDURE — 1123F ACP DISCUSS/DSCN MKR DOCD: CPT | Performed by: FAMILY MEDICINE

## 2020-01-09 PROCEDURE — 4040F PNEUMOC VAC/ADMIN/RCVD: CPT | Performed by: FAMILY MEDICINE

## 2020-01-09 RX ORDER — ROSUVASTATIN CALCIUM 20 MG/1
20 TABLET, COATED ORAL DAILY
Qty: 30 TABLET | Refills: 2 | Status: SHIPPED | OUTPATIENT
Start: 2020-01-09 | End: 2020-04-01 | Stop reason: SDUPTHER

## 2020-01-09 RX ORDER — LOSARTAN POTASSIUM 25 MG/1
25 TABLET ORAL NIGHTLY
Qty: 30 TABLET | Refills: 2 | Status: SHIPPED | OUTPATIENT
Start: 2020-01-09 | End: 2020-04-01 | Stop reason: SDUPTHER

## 2020-01-09 ASSESSMENT — PATIENT HEALTH QUESTIONNAIRE - PHQ9
SUM OF ALL RESPONSES TO PHQ9 QUESTIONS 1 & 2: 0
SUM OF ALL RESPONSES TO PHQ QUESTIONS 1-9: 0
2. FEELING DOWN, DEPRESSED OR HOPELESS: 0
SUM OF ALL RESPONSES TO PHQ QUESTIONS 1-9: 0
1. LITTLE INTEREST OR PLEASURE IN DOING THINGS: 0

## 2020-01-09 ASSESSMENT — ENCOUNTER SYMPTOMS
SHORTNESS OF BREATH: 0
CHEST TIGHTNESS: 0

## 2020-01-09 NOTE — PROGRESS NOTES
Patient ID: Holli Barron [id number]    . Chief Complaint   Patient presents with    Diabetes    Hyperlipidemia    Hypertension         Diabetes   He presents for his follow-up diabetic visit. He has type 2 diabetes mellitus. His disease course has been fluctuating. Pertinent negatives for diabetes include no chest pain. There are no hypoglycemic complications. Symptoms are stable. Diabetic complications include heart disease. Current diabetic treatment includes insulin injections. He is compliant with treatment some of the time. His weight is stable. He is following a high fat/cholesterol (works at [employer]. wife cooks lots of pasta) diet. When asked about meal planning, he reported none. He has not had a previous visit with a dietitian. His home blood glucose trend is fluctuating dramatically (did not bring sugars readings.  highest sugar readings are in the morning). An ACE inhibitor/angiotensin II receptor blocker is being taken. Hyperlipidemia   This is a chronic problem. The current episode started more than 1 year ago. Pertinent negatives include no chest pain or shortness of breath. Current antihyperlipidemic treatment includes statins. Hypertension   This is a chronic problem. The problem is unchanged. The problem is controlled. Pertinent negatives include no chest pain, palpitations or shortness of breath. Past treatments include angiotensin blockers. The current treatment provides significant improvement. Gastroesophageal Reflux   He reports no chest pain. on Celexa. This is a chronic problem. Risk factors include obesity (eats out a lot). He has tried a PPI for the symptoms. The treatment provided significant relief. Mental Health Problem   Primary symptoms comment: on Celexa. This is a chronic problem. The onset of the illness is precipitated by emotional stress. Additional symptoms of the illness do not include appetite change.    Foot Injury    The injury mechanism was a direct Feet:       Comments: Feet without lesions     Feet:      Right foot:      Protective Sensation: 5 sites tested. 5 sites sensed. Skin integrity: No ulcer, blister, skin breakdown, erythema, warmth, callus or dry skin. Left foot:      Protective Sensation: 5 sites tested. 5 sites sensed. Skin integrity: No ulcer, blister, skin breakdown, erythema, warmth, callus or dry skin. Skin:     General: Skin is warm and dry. Neurological:      Mental Status: He is alert and oriented to person, place, and time. Comments:      Psychiatric:         Behavior: Behavior normal.       Vitals:    01/09/20 1618   BP: 120/78   Site: Left Upper Arm   Position: Sitting   Cuff Size: Medium Adult   Pulse: 75   Weight: 223 lb (101.2 kg)   Height: 5' 7\" (1.702 m)     Body mass index is 34.93 kg/m². Wt Readings from Last 3 Encounters:   01/09/20 223 lb (101.2 kg)   10/09/19 220 lb 6.4 oz (100 kg)   07/15/19 221 lb 6.4 oz (100.4 kg)     BP Readings from Last 3 Encounters:   01/09/20 120/78   10/09/19 110/60   07/15/19 122/80          Results for orders placed or performed in visit on 01/09/20   POCT glycosylated hemoglobin (Hb A1C)   Result Value Ref Range    Hemoglobin A1C 7.7 %     The 10-year ASCVD risk score (Jai Mullins et al., 2013) is: 24.6%    Values used to calculate the score:      Age: 81 years      Sex: Male      Is Non- : No      Diabetic: Yes      Tobacco smoker: No      Systolic Blood Pressure: 197 mmHg      Is BP treated: Yes      HDL Cholesterol: 55 mg/dL      Total Cholesterol: 178 mg/dL  Lab Review   Office Visit on 10/09/2019   Component Date Value    Hemoglobin A1C 10/09/2019 7.6    Office Visit on 07/15/2019   Component Date Value    Hemoglobin A1C 07/15/2019 7.2            Assessment:       Diagnosis Orders   1.  Type 2 diabetes mellitus with complication, with long-term current use of insulin (HCC)  POCT glycosylated hemoglobin (Hb A1C)    [facility] Diet and Nutrition insulin 70-30 (NOVOLIN 70/30) (70-30) 100 UNIT per ML injection vial   2. Toe injury, left, initial encounter     3. Essential hypertension  losartan (COZAAR) 25 MG tablet   4. Hyperlipidemia, unspecified hyperlipidemia type  rosuvastatin (CRESTOR) 20 MG tablet           Plan:      See orders    DM not well controlled. Increasing the evening insulin by 5 units to 40 units in am and 25 units in evening. Weight loss with smaller portion sizes recommended. Re-check in 3 months. Possibly has broken toe. OK to take NSAIDS if needed.

## 2020-01-10 NOTE — PATIENT INSTRUCTIONS
The diagnoses and medications listed in this after visit summary may not be accurate at the time of check out. Please check MY CHART in 28-48 hours for possible corrections. Late cancellation policy: So that we can better accommodate people who are sick, please give our office 24 hour notice for an appointment cancellation. Thank you. Missed appointments: Your care is very important to us. It is important that you keep your scheduled appointments. Multiple missed appointments may lead to a dismissal from the office. Later arrival policy: If you are more than 10 minutes late for your appointment, you will be asked to reschedule. Please allow 5-7 business days for paperwork to be processed. It is important that you check your MY Chart messages, as they include appointment reminders, test results, and other important information. If you have forgotten your password, please call [phone].

## 2020-02-05 ENCOUNTER — HOSPITAL ENCOUNTER (OUTPATIENT)
Dept: DIABETES SERVICES | Age: 70
Setting detail: THERAPIES SERIES
Discharge: HOME | End: 2020-02-05
Payer: COMMERCIAL

## 2020-02-05 PROCEDURE — 97802 MEDICAL NUTRITION INDIV IN: CPT

## 2020-04-01 ENCOUNTER — OFFICE VISIT (OUTPATIENT)
Dept: FAMILY MEDICINE CLINIC | Age: 70
End: 2020-04-01
Payer: COMMERCIAL

## 2020-04-01 VITALS
TEMPERATURE: 97.9 F | WEIGHT: 216.8 LBS | DIASTOLIC BLOOD PRESSURE: 73 MMHG | HEART RATE: 91 BPM | HEIGHT: 67 IN | BODY MASS INDEX: 34.03 KG/M2 | SYSTOLIC BLOOD PRESSURE: 122 MMHG

## 2020-04-01 LAB
A/G RATIO: 1.4 (ref 1.1–2.2)
ALBUMIN SERPL-MCNC: 4 G/DL (ref 3.4–5)
ALP BLD-CCNC: 73 U/L (ref 40–129)
ALT SERPL-CCNC: 14 U/L (ref 10–40)
ANION GAP SERPL CALCULATED.3IONS-SCNC: 13 MMOL/L (ref 3–16)
AST SERPL-CCNC: 15 U/L (ref 15–37)
BILIRUB SERPL-MCNC: 0.4 MG/DL (ref 0–1)
BUN BLDV-MCNC: 16 MG/DL (ref 7–20)
CALCIUM SERPL-MCNC: 9.4 MG/DL (ref 8.3–10.6)
CHLORIDE BLD-SCNC: 98 MMOL/L (ref 99–110)
CHOLESTEROL, TOTAL: 180 MG/DL (ref 0–199)
CO2: 27 MMOL/L (ref 21–32)
CREAT SERPL-MCNC: 1.1 MG/DL (ref 0.8–1.3)
CREATININE URINE: 96.4 MG/DL (ref 39–259)
GFR AFRICAN AMERICAN: >60
GFR NON-AFRICAN AMERICAN: >60
GLOBULIN: 2.9 G/DL
GLUCOSE BLD-MCNC: 246 MG/DL (ref 70–99)
HBA1C MFR BLD: 7.6 %
HDLC SERPL-MCNC: 48 MG/DL (ref 40–60)
LDL CHOLESTEROL CALCULATED: 113 MG/DL
MICROALBUMIN UR-MCNC: <1.2 MG/DL
MICROALBUMIN/CREAT UR-RTO: NORMAL MG/G (ref 0–30)
POTASSIUM SERPL-SCNC: 4.7 MMOL/L (ref 3.5–5.1)
SODIUM BLD-SCNC: 138 MMOL/L (ref 136–145)
TOTAL PROTEIN: 6.9 G/DL (ref 6.4–8.2)
TRIGL SERPL-MCNC: 96 MG/DL (ref 0–150)
VLDLC SERPL CALC-MCNC: 19 MG/DL

## 2020-04-01 PROCEDURE — 83036 HEMOGLOBIN GLYCOSYLATED A1C: CPT | Performed by: FAMILY MEDICINE

## 2020-04-01 PROCEDURE — G8428 CUR MEDS NOT DOCUMENT: HCPCS | Performed by: FAMILY MEDICINE

## 2020-04-01 PROCEDURE — 4040F PNEUMOC VAC/ADMIN/RCVD: CPT | Performed by: FAMILY MEDICINE

## 2020-04-01 PROCEDURE — 3051F HG A1C>EQUAL 7.0%<8.0%: CPT | Performed by: FAMILY MEDICINE

## 2020-04-01 PROCEDURE — 99214 OFFICE O/P EST MOD 30 MIN: CPT | Performed by: FAMILY MEDICINE

## 2020-04-01 PROCEDURE — 1123F ACP DISCUSS/DSCN MKR DOCD: CPT | Performed by: FAMILY MEDICINE

## 2020-04-01 PROCEDURE — 3017F COLORECTAL CA SCREEN DOC REV: CPT | Performed by: FAMILY MEDICINE

## 2020-04-01 PROCEDURE — 1036F TOBACCO NON-USER: CPT | Performed by: FAMILY MEDICINE

## 2020-04-01 PROCEDURE — 2022F DILAT RTA XM EVC RTNOPTHY: CPT | Performed by: FAMILY MEDICINE

## 2020-04-01 PROCEDURE — G8417 CALC BMI ABV UP PARAM F/U: HCPCS | Performed by: FAMILY MEDICINE

## 2020-04-01 RX ORDER — LOSARTAN POTASSIUM 25 MG/1
25 TABLET ORAL NIGHTLY
Qty: 30 TABLET | Refills: 2 | Status: SHIPPED | OUTPATIENT
Start: 2020-04-01 | End: 2020-07-01 | Stop reason: SDUPTHER

## 2020-04-01 RX ORDER — CITALOPRAM 40 MG/1
40 TABLET ORAL DAILY
Qty: 30 TABLET | Refills: 11 | Status: SHIPPED | OUTPATIENT
Start: 2020-04-01 | End: 2021-01-01 | Stop reason: SDUPTHER

## 2020-04-01 RX ORDER — ESOMEPRAZOLE MAGNESIUM 40 MG/1
40 CAPSULE, DELAYED RELEASE ORAL DAILY
Qty: 30 CAPSULE | Refills: 11 | Status: SHIPPED | OUTPATIENT
Start: 2020-04-01 | End: 2021-01-01 | Stop reason: SDUPTHER

## 2020-04-01 RX ORDER — ROSUVASTATIN CALCIUM 20 MG/1
20 TABLET, COATED ORAL DAILY
Qty: 30 TABLET | Refills: 2 | Status: SHIPPED | OUTPATIENT
Start: 2020-04-01 | End: 2020-07-01 | Stop reason: SDUPTHER

## 2020-04-01 ASSESSMENT — ENCOUNTER SYMPTOMS: SHORTNESS OF BREATH: 0

## 2020-04-01 NOTE — PROGRESS NOTES
Negative for appetite change and unexpected weight change. Respiratory: Negative for shortness of breath. Cardiovascular: Negative for chest pain and palpitations. Patient Active Problem List   Diagnosis    Angina, class II ([address]    Abnormal nuclear cardiac imaging test    History of PTCA    Essential hypertension    Hyperlipidemia, mixed    Gastroesophageal reflux disease without esophagitis    Bilateral carotid artery disease (HCC)    Type 2 diabetes mellitus with complication, with long-term current use of insulin (HCC)    Anxiety       Past Surgical History:   Procedure Laterality Date    ADENOIDECTOMY      CARDIAC CATHETERIZATION      Heart cath with stent placed in back side of heart    COLONOSCOPY  02/25/2014    TOTAL KNEE ARTHROPLASTY Right 12/2015    Right knee replaced    TOTAL KNEE ARTHROPLASTY Left 07/2014    Left knee replaced    VEIN SURGERY Right 2015    Vein stripped in right leg       Family History   Problem Relation Age of Onset    Osteoarthritis Mother     Rheumatologic Disease Mother         Raynaud's/ Crest     Heart Disease Father     Asthma Father     Diabetes Father     Heart Failure Father     Seizures Brother     Cancer Maternal Aunt     Stroke Maternal Uncle     Diabetes Paternal Aunt     High Cholesterol Paternal Aunt     Rashes/Skin Problems Maternal Grandmother     Stroke Maternal Grandmother     Diabetes Paternal Grandmother     Heart Failure Paternal Grandfather        Current Outpatient Medications on File Prior to Visit   Medication Sig Dispense Refill    glucose blood VI test strips (SVETA CONTOUR NEXT TEST) strip 1 each by In Vitro route 2 times daily As needed.  Omega 3-6-9 Fatty Acids (OMEGA 3-6-9 COMPLEX PO) Take by mouth      glucose ([facility] GLUCOSE) 4 G chewable tablet Take 4 tablets by mouth as needed for Low blood sugar 60 tablet 0    Cinnamon 500 MG CAPS Take 500 mg by mouth 2 times daily.       Multiple

## 2020-07-01 ENCOUNTER — VIRTUAL VISIT (OUTPATIENT)
Dept: FAMILY MEDICINE CLINIC | Age: 70
End: 2020-07-01
Payer: COMMERCIAL

## 2020-07-01 LAB — HBA1C MFR BLD: 6.8 %

## 2020-07-01 PROCEDURE — 3017F COLORECTAL CA SCREEN DOC REV: CPT | Performed by: FAMILY MEDICINE

## 2020-07-01 PROCEDURE — 83036 HEMOGLOBIN GLYCOSYLATED A1C: CPT | Performed by: FAMILY MEDICINE

## 2020-07-01 PROCEDURE — 1123F ACP DISCUSS/DSCN MKR DOCD: CPT | Performed by: FAMILY MEDICINE

## 2020-07-01 PROCEDURE — 2022F DILAT RTA XM EVC RTNOPTHY: CPT | Performed by: FAMILY MEDICINE

## 2020-07-01 PROCEDURE — 99213 OFFICE O/P EST LOW 20 MIN: CPT | Performed by: FAMILY MEDICINE

## 2020-07-01 PROCEDURE — 4040F PNEUMOC VAC/ADMIN/RCVD: CPT | Performed by: FAMILY MEDICINE

## 2020-07-01 PROCEDURE — 3044F HG A1C LEVEL LT 7.0%: CPT | Performed by: FAMILY MEDICINE

## 2020-07-01 PROCEDURE — G8428 CUR MEDS NOT DOCUMENT: HCPCS | Performed by: FAMILY MEDICINE

## 2020-07-01 RX ORDER — ROSUVASTATIN CALCIUM 20 MG/1
20 TABLET, COATED ORAL DAILY
Qty: 30 TABLET | Refills: 2 | Status: SHIPPED | OUTPATIENT
Start: 2020-07-01 | End: 2020-09-23 | Stop reason: SDUPTHER

## 2020-07-01 RX ORDER — LOSARTAN POTASSIUM 25 MG/1
25 TABLET ORAL NIGHTLY
Qty: 30 TABLET | Refills: 2 | Status: SHIPPED | OUTPATIENT
Start: 2020-07-01 | End: 2020-09-23 | Stop reason: SDUPTHER

## 2020-07-01 ASSESSMENT — ENCOUNTER SYMPTOMS
SHORTNESS OF BREATH: 0
CHEST TIGHTNESS: 0

## 2020-07-01 NOTE — PROGRESS NOTES
2020    TELEHEALTH EVALUATION -- Audio/Visual (During XZUMI-87 public health emergency)    HPI:    Manpreet Oliva (:  1950) has requested an audio/video evaluation for the following concern(s):      Diabetes   He presents for his follow-up (he cut back on his morning insulin from 39 to 35 because was having \"low\" sugars) diabetic visit. He has type 2 diabetes mellitus. His disease course has been fluctuating. Pertinent negatives for diabetes include no chest pain. There are no hypoglycemic complications. Symptoms are stable. Diabetic complications include heart disease. Current diabetic treatment includes insulin injections. He is compliant with treatment some of the time. His weight is stable. He is following a high fat/cholesterol (works at [employer]  ) diet. When asked about meal planning, he reported none. He has not had a previous visit with a dietitian. His home blood glucose trend . An ACE inhibitor/angiotensin II receptor blocker is being taken. Hyperlipidemia   This is a chronic problem. The current episode started more than 1 year ago. Pertinent negatives include no chest pain or shortness of breath. Current antihyperlipidemic treatment includes statins. Hypertension   This is a chronic problem. The problem is unchanged. The problem is controlled. Pertinent negatives include no chest pain, palpitations or shortness of breath. Past treatments include angiotensin blockers. The current treatment provides significant improvement. Gastroesophageal Reflux   He reports no chest pain. on Celexa. This is a chronic problem. Risk factors include obesity. He has tried a PPI for the symptoms. The treatment provided significant relief. Mental Health Problem   Primary symptoms comment: on Celexa. This is a chronic problem. The onset of the illness is precipitated by emotional stress. Additional symptoms of the illness do not include appetite change or unexpected weight change.    Review Diabetes mellitus ([facility] [address]     Hx of Doppler ultrasound 05/15/2018    Carotid-mild 0-49% bilateral    Hyperlipidemia     Hypertension     MDRO (multiple drug resistant organisms) resistance     MRSA in Rt elbow 11/13       PHYSICAL EXAMINATION:  [ INSTRUCTIONS:  \"[x]\" Indicates a positive item  \"[]\" Indicates a negative item  -- DELETE ALL ITEMS NOT EXAMINED]  Vital Signs: (As obtained by patient/caregiver or practitioner observation)    Blood pressure-  Heart rate-    Respiratory rate-    Temperature-  Pulse oximetry-     Constitutional: [] Appears well-developed and well-nourished [] No apparent distress      [] Abnormal-   Mental status  [] Alert and awake  [] Oriented to person/place/time []Able to follow commands      Eyes:  EOM    []  Normal  [] Abnormal-  Sclera  []  Normal  [] Abnormal -         Discharge []  None visible  [] Abnormal -    HENT:   [] Normocephalic, atraumatic. [] Abnormal   [] Mouth/Throat: Mucous membranes are moist.     External Ears [] Normal  [] Abnormal-     Neck: [] No visualized mass     Pulmonary/Chest: [] Respiratory effort normal.  [] No visualized signs of difficulty breathing or respiratory distress        [] Abnormal-      Musculoskeletal:   [] Normal gait with no signs of ataxia         [] Normal range of motion of neck        [] Abnormal-       Neurological:        [] No Facial Asymmetry (Cranial nerve 7 motor function) (limited exam to video visit)          [] No gaze palsy        [] Abnormal-         Skin:        [] No significant exanthematous lesions or discoloration noted on facial skin         [] Abnormal-            Psychiatric:       [] Normal Affect [] No Hallucinations        [] Abnormal-     Other pertinent observable physical exam findings-   There were no vitals filed for this visit. There is no height or weight on file to calculate BMI.      Wt Readings from Last 3 Encounters:   04/01/20 216 lb 12.8 oz (98.3 kg)   01/09/20 223 lb (101.2 kg)   10/09/19 220 lb 6.4 oz (100 kg)     BP Readings from Last 3 Encounters:   04/01/20 122/73   01/09/20 120/78   10/09/19 110/60         Diagnosis Orders   1. Type 2 diabetes mellitus with complication, with long-term current use of insulin (HCC)  POCT glycosylated hemoglobin (Hb A1C)    insulin 70-30 (NOVOLIN 70/30) (70-30) 100 UNIT per ML injection vial   2. Hyperlipidemia, unspecified hyperlipidemia type  rosuvastatin (CRESTOR) 20 MG tablet   3. Essential hypertension  losartan (COZAAR) 25 MG tablet     Reducing the insulin to 33 units in the morning and 23 units in the evening. He is to continue to take his glucose tablets as needed. Reminded him to get shingles vaccine and to get his diabetic eye exam.    Return in about 3 months (around 9/24/2020) for In office, DM, HTN, Hyperlipid. Alysia Juarez is a 81 y.o. male being evaluated by a Virtual Visit (video visit) encounter to address concerns as mentioned above. A caregiver was present when appropriate. Due to this being a TeleHealth encounter ([facility]), evaluation of the following organ systems was limited: Vitals/Constitutional/EENT/Resp/CV/GI//MS/Neuro/Skin/Heme-Lymph-Imm. Pursuant to the emergency declaration under the [address] [place] [address], [address] [place] [address] authority and the [employer] and [employer] Act, this Virtual Visit was conducted with patient's (and/or legal guardian's) consent, to reduce the patient's risk of exposure to COVID-19 and provide necessary medical care. The patient (and/or legal guardian) has also been advised to contact this office for worsening conditions or problems, and seek emergency medical treatment and/or call 911 if deemed necessary.      Patient identification was verified at the start of the visit: Yes    Total time spent on this encounter: Not billed by time    Services were provided through a video synchronous discussion virtually to

## 2020-08-05 ENCOUNTER — HOSPITAL ENCOUNTER (EMERGENCY)
Age: 70
Discharge: HOME | End: 2020-08-06
Payer: COMMERCIAL

## 2020-08-05 PROCEDURE — 83690 ASSAY OF LIPASE: CPT

## 2020-08-05 PROCEDURE — 83605 ASSAY OF LACTIC ACID: CPT

## 2020-08-05 PROCEDURE — 85025 COMPLETE CBC W/AUTO DIFF WBC: CPT

## 2020-08-05 PROCEDURE — 80048 BASIC METABOLIC PNL TOTAL CA: CPT

## 2020-08-05 PROCEDURE — 84484 ASSAY OF TROPONIN QUANT: CPT

## 2020-08-05 PROCEDURE — 93005 ELECTROCARDIOGRAM TRACING: CPT | Performed by: PHYSICIAN ASSISTANT

## 2020-08-05 PROCEDURE — 99284 EMERGENCY DEPT VISIT MOD MDM: CPT

## 2020-08-05 ASSESSMENT — PAIN DESCRIPTION - LOCATION: LOCATION: ABDOMEN

## 2020-08-05 ASSESSMENT — PAIN SCALES - GENERAL: PAINLEVEL_OUTOF10: 7

## 2020-08-06 VITALS
HEIGHT: 68 IN | OXYGEN SATURATION: 98 % | SYSTOLIC BLOOD PRESSURE: 145 MMHG | DIASTOLIC BLOOD PRESSURE: 70 MMHG | WEIGHT: 210 LBS | HEART RATE: 57 BPM | TEMPERATURE: 97.6 F | RESPIRATION RATE: 18 BRPM | BODY MASS INDEX: 31.83 KG/M2

## 2020-08-06 LAB
ANION GAP SERPL CALCULATED.3IONS-SCNC: 12 MMOL/L (ref 4–16)
BASOPHILS ABSOLUTE: 0.1 K/CU MM
BASOPHILS RELATIVE PERCENT: 1 % (ref 0–1)
BUN BLDV-MCNC: 20 MG/DL (ref 6–23)
CALCIUM SERPL-MCNC: 9 MG/DL (ref 8.3–10.6)
CHLORIDE BLD-SCNC: 101 MMOL/L (ref 99–110)
CO2: 26 MMOL/L (ref 21–32)
CREAT SERPL-MCNC: 0.9 MG/DL (ref 0.9–1.3)
DIFFERENTIAL TYPE: ABNORMAL
EOSINOPHILS ABSOLUTE: 0.2 K/CU MM
EOSINOPHILS RELATIVE PERCENT: 1.5 % (ref 0–3)
GFR AFRICAN AMERICAN: >60 ML/MIN/1.73M2
GFR NON-AFRICAN AMERICAN: >60 ML/MIN/1.73M2
GLUCOSE BLD-MCNC: 200 MG/DL (ref 70–99)
HCT VFR BLD CALC: 42.2 % (ref 42–52)
HEMOGLOBIN: 13.7 GM/DL (ref 13.5–18)
IMMATURE NEUTROPHIL %: 0.3 % (ref 0–0.43)
LACTATE: 1.1 MMOL/L (ref 0.4–2)
LIPASE: 27 IU/L (ref 13–60)
LYMPHOCYTES ABSOLUTE: 1.8 K/CU MM
LYMPHOCYTES RELATIVE PERCENT: 15.7 % (ref 24–44)
MCH RBC QN AUTO: 29.5 PG (ref 27–31)
MCHC RBC AUTO-ENTMCNC: 32.5 % (ref 32–36)
MCV RBC AUTO: 90.8 FL (ref 78–100)
MONOCYTES ABSOLUTE: 0.8 K/CU MM
MONOCYTES RELATIVE PERCENT: 7.3 % (ref 0–4)
NUCLEATED RBC %: 0 %
PDW BLD-RTO: 13 % (ref 11.7–14.9)
PLATELET # BLD: 239 K/CU MM (ref 140–440)
PMV BLD AUTO: 9.8 FL (ref 7.5–11.1)
POTASSIUM SERPL-SCNC: 4.4 MMOL/L (ref 3.5–5.1)
RBC # BLD: 4.65 M/CU MM (ref 4.6–6.2)
SEGMENTED NEUTROPHILS ABSOLUTE COUNT: 8.4 K/CU MM
SEGMENTED NEUTROPHILS RELATIVE PERCENT: 74.2 % (ref 36–66)
SODIUM BLD-SCNC: 139 MMOL/L (ref 135–145)
TOTAL IMMATURE NEUTOROPHIL: 0.03 K/CU MM
TOTAL NUCLEATED RBC: 0 K/CU MM
TROPONIN T: <0.01 NG/ML
WBC # BLD: 11.3 K/CU MM (ref 4–10.5)

## 2020-08-06 PROCEDURE — 6370000000 HC RX 637 (ALT 250 FOR IP): Performed by: PHYSICIAN ASSISTANT

## 2020-08-06 PROCEDURE — 93010 ELECTROCARDIOGRAM REPORT: CPT | Performed by: INTERNAL MEDICINE

## 2020-08-06 RX ORDER — ONDANSETRON 4 MG/1
4 TABLET, ORALLY DISINTEGRATING ORAL ONCE
Status: COMPLETED | OUTPATIENT
Start: 2020-08-06 | End: 2020-08-06

## 2020-08-06 RX ORDER — DICYCLOMINE HCL 20 MG
20 TABLET ORAL ONCE
Status: COMPLETED | OUTPATIENT
Start: 2020-08-06 | End: 2020-08-06

## 2020-08-06 RX ORDER — DICYCLOMINE HYDROCHLORIDE 10 MG/1
20 CAPSULE ORAL EVERY 6 HOURS PRN
Qty: 30 CAPSULE | Refills: 0 | Status: SHIPPED | OUTPATIENT
Start: 2020-08-06 | End: 2021-01-01

## 2020-08-06 RX ORDER — ONDANSETRON 4 MG/1
4 TABLET, ORALLY DISINTEGRATING ORAL EVERY 8 HOURS PRN
Qty: 10 TABLET | Refills: 0 | Status: SHIPPED | OUTPATIENT
Start: 2020-08-06 | End: 2021-01-01

## 2020-08-06 RX ADMIN — ONDANSETRON 4 MG: 4 TABLET, ORALLY DISINTEGRATING ORAL at 00:50

## 2020-08-06 RX ADMIN — DICYCLOMINE HYDROCHLORIDE 20 MG: 20 TABLET ORAL at 00:50

## 2020-08-06 NOTE — ED PROVIDER NOTES
12 lead EKG as interpreted by me reveals sinus bradycardia. Axis is normal. There are no ischemic ST elevations or other suspicious ST changes;  QRS interval is narrow, QT interval is not prolonged.  Final interpretation: Sinus bradycardia        Candy Spivey MD  08/06/20 0000

## 2020-08-06 NOTE — ED PROVIDER NOTES
[facility] ENCOUNTER      PCP: Jesusita Monique MD    [facility]    Chief Complaint   Patient presents with    Emesis    Nausea    Abdominal Pain       This patient was not evaluated by the attending physician. I have independently evaluated this patient. HPI    Huan Porras is a 81 y.o. male who presents with abdominal pain, nausea, emesis, and diarrhea. Patient reports that he had one episode of nonbloody diarrhea after eating lunch today. He reports that shortly after dinner around 8 PM he then had 2 episodes of nonbloody, nonbilious emesis that \"resembled the food I ate for dinner and lunch\" and then abdominal pain began. Abdominal pain located in epigastric region. Characteristic abdominal pain is described as cramping. Abdominal pain has been intermittent. No pain currently at this time. Abdominal pain does not radiate. Patient reports he currently feels nauseated. He has had no further episodes of emesis or diarrhea. Patient's wife is at bedside and is very concerned because patient is an insulin-dependent type 2 diabetic and she is concerned that with his emesis that his blood sugar will drop too low. Patient denies constipation, melena, hematochezia, hematemesis, chest pain, diaphoresis, shortness of breath. REVIEW OF SYSTEMS    Constitutional:  Denies fever, chills  HENT:  Denies sore throat or ear pain   Cardiovascular:  Denies chest pain, palpitations or swelling   Respiratory:  Denies cough or shortness of breath   GI:  See HPI above  : No hematuria, urinary urgency, urinary frequency, dysuria. Musculoskeletal:  Denies back pain or groin pain or masses. No pain or swelling of extremities.   Skin:  Denies rash  Neurologic:  Denies headache, focal weakness or sensory changes   Endocrine:  Denies polyuria or polydypsia   Lymphatic:  Denies swollen glands     All other review of systems are negative  See HPI and nursing notes for additional information PAST MEDICAL & SURGICAL HISTORY    Past Medical History:   Diagnosis Date    Arthritis     CAD (coronary artery disease)     Diabetes mellitus ([facility] [address]     Hx of Doppler ultrasound 05/15/2018    Carotid-mild 0-49% bilateral    Hyperlipidemia     Hypertension     MDRO (multiple drug resistant organisms) resistance     MRSA in Rt elbow 11/13     Past Surgical History:   Procedure Laterality Date    ADENOIDECTOMY      CARDIAC CATHETERIZATION      Heart cath with stent placed in back side of heart    COLONOSCOPY  02/25/2014    TOTAL KNEE ARTHROPLASTY Right 12/2015    Right knee replaced    TOTAL KNEE ARTHROPLASTY Left 07/2014    Left knee replaced    VEIN SURGERY Right 2015    Vein stripped in right leg       CURRENT MEDICATIONS    Current Outpatient Rx   Medication Sig Dispense Refill    ondansetron (ZOFRAN ODT) 4 MG disintegrating tablet Take 1 tablet by mouth every 8 hours as needed for Nausea or Vomiting 10 tablet 0    dicyclomine (BENTYL) 10 MG capsule Take 2 capsules by mouth every 6 hours as needed (abdominal pain) 30 capsule 0    insulin 70-30 (NOVOLIN 70/30) (70-30) 100 UNIT per ML injection vial Inject 33 Units into the skin every morning (before breakfast) AND 23 Units Daily with supper. 2 vial 2    rosuvastatin (CRESTOR) 20 MG tablet Take 1 tablet by mouth daily 30 tablet 2    losartan (COZAAR) 25 MG tablet Take 1 tablet by mouth nightly 30 tablet 2    citalopram (CELEXA) 40 MG tablet Take 1 tablet by mouth daily 30 tablet 11    esomeprazole (NEXIUM) 40 MG delayed release capsule Take 1 capsule by mouth daily 30 capsule 11    Insulin Syringe-Needle U-100 ([employer] INSULIN SYR 1CC/30G) 30G X 5/16\" 1 ML MISC 1 each by Does not apply route 2 times daily 100 each 11    glucose blood VI test strips (SVETA CONTOUR NEXT TEST) strip 1 each by In Vitro route 2 times daily As needed.       Omega 3-6-9 Fatty Acids (OMEGA 3-6-9 COMPLEX PO) Take by mouth      glucose ([facility] GLUCOSE) 4 G chewable (H) 0 - 4 %    Eosinophils % 1.5 0 - 3 %    Basophils % 1.0 0 - 1 %    Segs Absolute 8.4 K/CU MM    Lymphocytes Absolute 1.8 K/CU MM    Monocytes Absolute 0.8 K/CU MM    Eosinophils Absolute 0.2 K/CU MM    Basophils Absolute 0.1 K/CU MM    Nucleated RBC % 0.0 %    Total Nucleated RBC 0.0 K/CU MM    Total Immature Neutrophil 0.03 K/CU MM    Immature Neutrophil % 0.3 0 - 0.43 %   Basic Metabolic Panel w/ Reflex to MG   Result Value Ref Range    Sodium 139 135 - 145 MMOL/L    Potassium 4.4 3.5 - 5.1 MMOL/L    Chloride 101 99 - 110 mMol/L    CO2 26 21 - 32 MMOL/L    Anion Gap 12 4 - 16    BUN 20 6 - 23 MG/DL    CREATININE 0.9 0.9 - 1.3 MG/DL    Glucose 200 (H) 70 - 99 MG/DL    Calcium 9.0 8.3 - 10.6 MG/DL    GFR Non-African American >60 >60 mL/min/1.73m2    GFR African American >60 >60 mL/min/1.73m2   Troponin   Result Value Ref Range    Troponin T <0.010 <0.01 NG/ML   Lipase   Result Value Ref Range    Lipase 27 13 - 60 IU/L   Lactic Acid, Plasma   Result Value Ref Range    Lactate 1.1 0.4 - 2.0 mMOL/L     Point-of-care glucose prior to discharge is 185      EKG Interpretation  Please see ED physician's note for EKG interpretation - Dr. Dinah Grewal    RADIOLOGY/PROCEDURES    No orders to display                ED COURSE & MEDICAL DECISION MAKING       Vital signs and nursing notes reviewed during ED course. I have independently evaluated this patient. Supervising physician present in the Emergency Department, available for consultation, throughout entirety of  patient care. Patient presents as above which prompted workup. While in the ED today, labs and EKG were obtained. For EKG interpretation- see ED physician's note. CBC with mild leukocytosis of 11.3. BMP with elevated glucose of 200 without evidence concerning for DKA. Troponin negative. Lipase within normal limits- low clinical suspicion for pancreatitis. Lactic acid normal.  Initial abdominal exam and repeat abdominal exam are without peritoneal signs. Patient treated with oral Bentyl and oral Zofran with resolution of nausea, no further episodes of emesis, and resolution of abdominal pain. He is able to tolerate oral intake and drink water in the ED. Patient's glucose rechecked prior to discharge and was 185. Patient and patient's wife think patient may have gotten food poisoning as he did eat some chicken that wife did not eat she is concerned that it may have been undercooked from the restaurant. Patient's symptoms are likely secondary to gastroenteritis versus food poisoning versus early onset illness. We discussed that should patient continue to have symptoms or worsening abdominal pain that he should return in 12 to 24 hours for repeat evaluation. Patient verbalizes understanding. Patient is nontoxic appearing. Vital signs stable. Patient stable for outpatient management and comfortable with discharge at this time. Patient discharged home with prescriptions for Zofran and Bentyl- we discussed medication(s). All pertinent Lab data and radiographic results reviewed with patient at bedside. The patient and/or the family were informed of the results of any tests/labs/imaging, the treatment plan, and time was allotted to answer questions. Diagnosis, disposition, and treatment plan reviewed with patient and/or family who understands and agrees. I estimate there is LOW risk for ACUTE APPENDICITIS, BOWEL OBSTRUCTION, CHOLECYSTITIS, RUPTURED DIVERTICULITIS, INCARCERATED HERNIA, HEMMORHAGIC PANCREATITIS, or PERFORATED BOWEL/ULCER, thus I consider the discharge disposition reasonable. Also, there is no evidence or peritonitis, sepsis, or toxicity. Carmen [place]te[place] (or their surrogate) and I have discussed the diagnosis and risks, and we agree with discharging home with close follow-up. We also discussed returning to the Emergency Department immediately if new or worsening symptoms occur- strict return precautions given.  We have discussed the symptoms which are most concerning that necessitate immediate return. Patient understands and agrees to follow up with PCP for recheck in 1-2 days. Clinical  IMPRESSION    1. Non-intractable vomiting with nausea, unspecified vomiting type    2. Diarrhea, unspecified type    3. Abdominal pain, unspecified abdominal location    4. Hyperglycemia        Disposition referral (if applicable):  Aniket SenderMD Gross [address] [place] [phone]  [phone]    Call today  Recheck in 1-2 days    [facility] Emergency Department  [place] [facility] [phone]  Go to   Return to ED if symptoms worsen or new symptoms      Disposition medications (if applicable):  Discharge Medication List as of 8/6/2020  2:09 AM      START taking these medications    Details   ondansetron (ZOFRAN ODT) 4 MG disintegrating tablet Take 1 tablet by mouth every 8 hours as needed for Nausea or Vomiting, Disp-10 tablet,R-0Print      dicyclomine (BENTYL) 10 MG capsule Take 2 capsules by mouth every 6 hours as needed (abdominal pain), Disp-30 capsule,R-0Print               Comment: Please note this report has been produced using speech recognition software and may contain errors related to that system including errors in grammar, punctuation, and spelling, as well as words and phrases that may be inappropriate. If there are any questions or concerns please feel free to contact the dictating provider for clarification.        Loc Fu PA-C  08/06/20 [id number]

## 2020-08-06 NOTE — ED TRIAGE NOTES
Abdominal pain with nausea and vomiting starting today. Does report IDDM with his sugars running in the 200's today. States this is higher than his normal of .

## 2020-08-07 ENCOUNTER — CARE COORDINATION (OUTPATIENT)
Dept: CARE COORDINATION | Age: 70
End: 2020-08-07

## 2020-08-07 NOTE — CARE COORDINATION
Date/Time:  8/7/2020 12:20 PM  Attempted to reach patient by telephone. Left HIPPA compliant message requesting a return call. Will attempt to reach patient again.

## 2020-08-10 NOTE — CARE COORDINATION
Date/Time:  8/10/2020 10:37 AM  Attempted to reach patient by telephone. Left HIPPA compliant message requesting a return call. Final attempt to reach patient.

## 2020-08-12 LAB
EKG ATRIAL RATE: 48 BPM
EKG DIAGNOSIS: NORMAL
EKG P AXIS: 94 DEGREES
EKG P-R INTERVAL: 188 MS
EKG Q-T INTERVAL: 426 MS
EKG QRS DURATION: 104 MS
EKG QTC CALCULATION (BAZETT): 380 MS
EKG R AXIS: 17 DEGREES
EKG T AXIS: 44 DEGREES
EKG VENTRICULAR RATE: 48 BPM

## 2020-09-23 ENCOUNTER — OFFICE VISIT (OUTPATIENT)
Dept: FAMILY MEDICINE CLINIC | Age: 70
End: 2020-09-23
Payer: COMMERCIAL

## 2020-09-23 VITALS
BODY MASS INDEX: 32.22 KG/M2 | HEART RATE: 54 BPM | OXYGEN SATURATION: 97 % | HEIGHT: 68 IN | WEIGHT: 212.6 LBS | TEMPERATURE: 97.3 F

## 2020-09-23 LAB — HBA1C MFR BLD: 6.4 %

## 2020-09-23 PROCEDURE — 69210 REMOVE IMPACTED EAR WAX UNI: CPT | Performed by: FAMILY MEDICINE

## 2020-09-23 PROCEDURE — 90694 VACC AIIV4 NO PRSRV 0.5ML IM: CPT | Performed by: FAMILY MEDICINE

## 2020-09-23 PROCEDURE — 4040F PNEUMOC VAC/ADMIN/RCVD: CPT | Performed by: FAMILY MEDICINE

## 2020-09-23 PROCEDURE — 3017F COLORECTAL CA SCREEN DOC REV: CPT | Performed by: FAMILY MEDICINE

## 2020-09-23 PROCEDURE — G8427 DOCREV CUR MEDS BY ELIG CLIN: HCPCS | Performed by: FAMILY MEDICINE

## 2020-09-23 PROCEDURE — 1036F TOBACCO NON-USER: CPT | Performed by: FAMILY MEDICINE

## 2020-09-23 PROCEDURE — 90471 IMMUNIZATION ADMIN: CPT | Performed by: FAMILY MEDICINE

## 2020-09-23 PROCEDURE — 3044F HG A1C LEVEL LT 7.0%: CPT | Performed by: FAMILY MEDICINE

## 2020-09-23 PROCEDURE — 83036 HEMOGLOBIN GLYCOSYLATED A1C: CPT | Performed by: FAMILY MEDICINE

## 2020-09-23 PROCEDURE — 1123F ACP DISCUSS/DSCN MKR DOCD: CPT | Performed by: FAMILY MEDICINE

## 2020-09-23 PROCEDURE — 2022F DILAT RTA XM EVC RTNOPTHY: CPT | Performed by: FAMILY MEDICINE

## 2020-09-23 PROCEDURE — G8417 CALC BMI ABV UP PARAM F/U: HCPCS | Performed by: FAMILY MEDICINE

## 2020-09-23 PROCEDURE — 99214 OFFICE O/P EST MOD 30 MIN: CPT | Performed by: FAMILY MEDICINE

## 2020-09-23 RX ORDER — ROSUVASTATIN CALCIUM 20 MG/1
20 TABLET, COATED ORAL DAILY
Qty: 30 TABLET | Refills: 5 | Status: SHIPPED | OUTPATIENT
Start: 2020-09-23 | End: 2021-01-01 | Stop reason: SDUPTHER

## 2020-09-23 RX ORDER — LOSARTAN POTASSIUM 25 MG/1
25 TABLET ORAL NIGHTLY
Qty: 30 TABLET | Refills: 5 | Status: SHIPPED | OUTPATIENT
Start: 2020-09-23 | End: 2021-01-01 | Stop reason: SDUPTHER

## 2020-09-23 ASSESSMENT — ENCOUNTER SYMPTOMS: SHORTNESS OF BREATH: 0

## 2020-09-23 NOTE — PROGRESS NOTES
Patient ID: Abe Rocha 1950    . Chief Complaint   Patient presents with    Diabetes         Diabetes   He presents for his follow-up diabetic visit. He has type 2 diabetes mellitus. His disease course has been fluctuating. Pertinent negatives for diabetes include no chest pain. There are no hypoglycemic complications. Symptoms are stable. Diabetic complications include heart disease. Current diabetic treatment includes insulin injections. He is compliant with treatment some of the time. His weight is stable. He is following a high fat/cholesterol (works at [employer]  ) diet. When asked about meal planning, he reported none. He has not had a previous visit with a dietitian. His home blood glucose trend is fluctuating minimally (when does not eat on time, has low sugar reading). An ACE inhibitor/angiotensin II receptor blocker is being taken. Hyperlipidemia   This is a chronic problem. The current episode started more than 1 year ago. Pertinent negatives include no chest pain or shortness of breath. Current antihyperlipidemic treatment includes statins. Hypertension   This is a chronic problem. The problem is unchanged. The problem is controlled. Pertinent negatives include no chest pain, palpitations or shortness of breath. Past treatments include angiotensin blockers. The current treatment provides significant improvement. Gastroesophageal Reflux   He reports no chest pain. on Celexa. This is a chronic problem. Risk factors include obesity. He has tried a PPI for the symptoms. The treatment provided significant relief. Mental Health Problem   Primary symptoms comment: on Celexa. This is a chronic problem. The onset of the illness is precipitated by emotional stress. Additional symptoms of the illness do not include appetite change or unexpected weight change. Review of Systems   Constitutional: Negative for appetite change and unexpected weight change.    Respiratory: Negative for shortness of breath. Cardiovascular: Negative for chest pain and palpitations.        Patient Active Problem List   Diagnosis    Angina, class II ([address]    Abnormal nuclear cardiac imaging test    History of PTCA    Essential hypertension    Hyperlipidemia, mixed    Gastroesophageal reflux disease without esophagitis    Bilateral carotid artery disease (HCC)    Type 2 diabetes mellitus with complication, with long-term current use of insulin (HCC)    Anxiety       Past Surgical History:   Procedure Laterality Date    ADENOIDECTOMY      CARDIAC CATHETERIZATION      Heart cath with stent placed in back side of heart    COLONOSCOPY  02/25/2014    TOTAL KNEE ARTHROPLASTY Right 12/2015    Right knee replaced    TOTAL KNEE ARTHROPLASTY Left 07/2014    Left knee replaced    VEIN SURGERY Right 2015    Vein stripped in right leg       Family History   Problem Relation Age of Onset    Osteoarthritis Mother     Rheumatologic Disease Mother         Raynaud's/ Crest     Heart Disease Father     Asthma Father     Diabetes Father     Heart Failure Father     Seizures Brother     Cancer Maternal Aunt     Stroke Maternal Uncle     Diabetes Paternal Aunt     High Cholesterol Paternal [employer]     Rashes/Skin Problems Maternal Grandmother     Stroke Maternal Grandmother     Diabetes Paternal Grandmother     Heart Failure Paternal Grandfather        Current Outpatient Medications on File Prior to Visit   Medication Sig Dispense Refill    ondansetron (ZOFRAN ODT) 4 MG disintegrating tablet Take 1 tablet by mouth every 8 hours as needed for Nausea or Vomiting 10 tablet 0    dicyclomine (BENTYL) 10 MG capsule Take 2 capsules by mouth every 6 hours as needed (abdominal pain) 30 capsule 0    citalopram (CELEXA) 40 MG tablet Take 1 tablet by mouth daily 30 tablet 11    esomeprazole (NEXIUM) 40 MG delayed release capsule Take 1 capsule by mouth daily 30 capsule 11    Insulin Syringe-Needle U-100 ([employer] INSULIN SYR 1CC/30G) 30G X 5/16\" 1 ML MISC 1 each by Does not apply route 2 times daily 100 each 11    glucose blood VI test strips (SVETA CONTOUR NEXT TEST) strip 1 each by In Vitro route 2 times daily As needed.  Omega 3-6-9 Fatty Acids (OMEGA 3-6-9 COMPLEX PO) Take by mouth      glucose ([facility] GLUCOSE) 4 G chewable tablet Take 4 tablets by mouth as needed for Low blood sugar 60 tablet 0    Cinnamon 500 MG CAPS Take 500 mg by mouth 2 times daily.  Multiple Vitamins-Minerals (MULTIVITAMIN PO) Take 1 tablet by mouth.  aspirin 81 MG tablet Take 81 mg by mouth daily. No current facility-administered medications on file prior to visit. Objective:         Physical Exam  Vitals signs and nursing note reviewed. Constitutional:       General: He is not in acute distress. Appearance: He is well-developed. He is obese. HENT:      Head: Normocephalic. Right Ear: There is impacted cerumen. Left Ear: There is impacted cerumen. Neck:      Thyroid: No thyromegaly. Trachea: No tracheal deviation. Cardiovascular:      Rate and Rhythm: Normal rate and regular rhythm. Pulses:           Dorsalis pedis pulses are 2+ on the right side and 2+ on the left side. Posterior tibial pulses are 2+ on the right side and 2+ on the left side. Heart sounds: S1 normal and S2 normal. No murmur. No gallop. Comments: Carotids without bruits  Pulmonary:      Effort: Pulmonary effort is normal. No respiratory distress. Breath sounds: Normal breath sounds. No wheezing or rales. Abdominal:      General: There is no distension. Palpations: Abdomen is soft. There is no mass. Tenderness: There is no abdominal tenderness. Musculoskeletal:      Right foot: Normal range of motion. No deformity. Left foot: Normal range of motion. No deformity. Comments: Feet without lesions     Feet:      Right foot:      Protective Sensation: 5 sites tested.  5 sites sensed. Skin integrity: No ulcer, blister, skin breakdown, erythema, warmth, callus or dry skin. Left foot:      Protective Sensation: 5 sites tested. 5 sites sensed. Skin integrity: No ulcer, blister, skin breakdown, erythema, warmth, callus or dry skin. Skin:     General: Skin is warm and dry. Neurological:      Mental Status: He is alert and oriented to person, place, and time. Comments:      Psychiatric:         Behavior: Behavior normal.       Vitals:    09/23/20 1047   Pulse: 54   Temp: 97.3 °F (36.3 °C)   TempSrc: Infrared   SpO2: 97%   Weight: 212 lb 9.6 oz (96.4 kg)   Height: 5' 7.5\" (1.715 m)     Body mass index is 32.81 kg/m².      Wt Readings from Last 3 Encounters:   09/23/20 212 lb 9.6 oz (96.4 kg)   08/05/20 210 lb (95.3 kg)   04/01/20 216 lb 12.8 oz (98.3 kg)     BP Readings from Last 3 Encounters:   08/06/20 (!) 145/70   04/01/20 122/73   01/09/20 120/78          Results for orders placed or performed in visit on 09/23/20   POCT glycosylated hemoglobin (Hb A1C)   Result Value Ref Range    Hemoglobin A1C 6.4 %     The 10-year ASCVD risk score (Ruthann Cotton, et al., 2013) is: 35%    Values used to calculate the score:      Age: 81 years      Sex: Male      Is Non- : No      Diabetic: Yes      Tobacco smoker: No      Systolic Blood Pressure: 997 mmHg      Is BP treated: Yes      HDL Cholesterol: 48 mg/dL      Total Cholesterol: 180 mg/dL  Lab Review   Admission on 08/05/2020, Discharged on 08/06/2020   Component Date Value    WBC 08/05/2020 11.3*    RBC 08/05/2020 4.65     Hemoglobin 08/05/2020 13.7     Hematocrit 08/05/2020 42.2     MCV 08/05/2020 90.8     MCH 08/05/2020 29.5     MCHC 08/05/2020 32.5     RDW 08/05/2020 13.0     Platelets 94/56/3637 239     MPV 08/05/2020 9.8     Differential Type 08/05/2020 AUTOMATED DIFFERENTIAL     Segs Relative 08/05/2020 74.2*    Lymphocytes % 08/05/2020 15.7*    Monocytes % 08/05/2020 7.3*    Eosinophils % 08/05/2020 1.5     Basophils % 08/05/2020 1.0     Segs Absolute 08/05/2020 8.4     Lymphocytes Absolute 08/05/2020 1.8     Monocytes Absolute 08/05/2020 0.8     Eosinophils Absolute 08/05/2020 0.2     Basophils Absolute 08/05/2020 0.1     Nucleated RBC % 08/05/2020 0.0     Total Nucleated RBC 08/05/2020 0.0     Total Immature Neutrophil 08/05/2020 0.03     Immature Neutrophil % 08/05/2020 0.3     Sodium 08/05/2020 139     Potassium 08/05/2020 4.4     Chloride 08/05/2020 101     CO2 08/05/2020 26     Anion Gap 08/05/2020 12     BUN 08/05/2020 20     CREATININE 08/05/2020 0.9     Glucose 08/05/2020 200*    Calcium 08/05/2020 9.0     GFR Non- 08/05/2020 >60     GFR  08/05/2020 >60     Troponin T 08/05/2020 <0.010     Lipase 08/05/2020 27     Ventricular Rate 08/05/2020 48     Atrial Rate 08/05/2020 48     P-R Interval 08/05/2020 188     QRS Duration 08/05/2020 104     Q-T Interval 08/05/2020 426     QTc Calculation (Bazett) 08/05/2020 380     P Axis 08/05/2020 94     R Axis 08/05/2020 17     T Axis 08/05/2020 44     Diagnosis 08/05/2020                      Value:Sinus bradycardia  Otherwise normal ECG  No previous ECGs available  Confirmed by Estela Cruz MD, Jose A Hernandez ([id number]) on 8/6/2020 12:28:20 PM      Lactate 08/05/2020 1.1    Virtual Visit on 07/01/2020   Component Date Value    Hemoglobin A1C 07/01/2020 6.8    Office Visit on 04/01/2020   Component Date Value    Hemoglobin A1C 04/01/2020 7.6     Sodium 04/01/2020 138     Potassium 04/01/2020 4.7     Chloride 04/01/2020 98*    CO2 04/01/2020 27     Anion Gap 04/01/2020 13     Glucose 04/01/2020 246*    BUN 04/01/2020 16     CREATININE 04/01/2020 1.1     GFR Non- 04/01/2020 >60     GFR  04/01/2020 >60     Calcium 04/01/2020 9.4     Total Protein 04/01/2020 6.9     Alb 04/01/2020 4.0     Albumin/Globulin Ratio 04/01/2020 1.4     Total Bilirubin 04/01/2020 0.4     Alkaline Phosphatase 04/01/2020 73     ALT 04/01/2020 14     AST 04/01/2020 15     Globulin 04/01/2020 2.9     Cholesterol, Total 04/01/2020 180     Triglycerides 04/01/2020 96     HDL 04/01/2020 48     LDL Calculated 04/01/2020 113*    VLDL Cholesterol Calcula* 04/01/2020 19     Microalbumin, Random Uri* 04/01/2020 <1.20     Creatinine, Ur 04/01/2020 96.4     Microalbumin Creatinine * 04/01/2020 see below            Assessment:       Diagnosis Orders   1. Type 2 diabetes mellitus with complication, with long-term current use of insulin (HCC)  POCT glycosylated hemoglobin (Hb A1C)    insulin 70-30 (NOVOLIN 70/30) (70-30) 100 UNIT per ML injection vial   2. Need for influenza vaccination  INFLUENZA, QUADV, ADJUVANTED, 65 YRS =, IM, PF, PREFILL SYR, 0.5ML (FLUAD)   3. Need for shingles vaccine     4. Essential hypertension  losartan (COZAAR) 25 MG tablet   5. Hyperlipidemia, unspecified hyperlipidemia type  rosuvastatin (CRESTOR) 20 MG tablet   6. Ringing in ear, bilateral  Amb External Referral To ENT    MD REMOVAL IMPACTED CERUMEN INSTRUMENTATION UNILAT   7. Impacted cerumen of both ears  MD REMOVAL IMPACTED CERUMEN INSTRUMENTATION UNILAT   8. Bilateral hearing loss, unspecified hearing loss type  Amb External Referral To ENT    MD REMOVAL IMPACTED CERUMEN INSTRUMENTATION UNILAT           Plan:   Hearing test shows decreased hearing--send ENT    Weight loss recommended      Decrease insulin by 5 units if he thinks he might be eating late    HTN stable. Continue current medications      The Bilateral ear canal was visualized and was noted to be obstructed by cerumen. Curretage was not successful and then the Bilateral were then irrigated with warm tap water and a syringe. The patient tolerated the procedure well. The ear were visualized and were normal after the procedure.       Get diabetes eye exam.  Return in about 25 weeks (around 3/17/2021) for HTN, DM, Hyperlipid, Fasting, Doxy, Parking lot A1c, Check BP prior.

## 2020-09-23 NOTE — PATIENT INSTRUCTIONS
October Fifth is the DEADLINE for voter registration for the November Third GENERAL ELECTION. Early voting starts October sixth. Don't forget to vote!!!        [address] TO ALL APPOINTMENTS    The diagnoses and medications listed in this after visit summary may not be accurate at the time of check out. Please check MY CHART in 28-48 hours for possible corrections. Late cancellation policy: So that we can better accommodate people who are sick, please give our office 24 hour notice for an appointment cancellation. Thank you. Missed appointments: Your care is very important to us. It is important that you keep your scheduled appointments. Multiple missed appointments will lead to a dismissal from the office. Later arrival policy: If you are more than 10 minutes late for your appointment, you will be asked to reschedule. Please allow 5-7 business days for paperwork to be processed. It is important that you check your MY Chart messages, as they include appointment reminders, test results, and other important information. If you have forgotten your password, please call [phone].

## 2020-09-23 NOTE — PROGRESS NOTES
Vaccine Information Sheet, \"Influenza - Inactivated\"  given to Alysia Juarez, or parent/legal guardian of  Alysia Juarez and verbalized understanding. Patient responses:    Have you ever had a reaction to a flu vaccine? No  Do you have any current illness? No  Have you ever had Guillian Branson Syndrome? No  Do you have a serious allergy to any of the follow: Neomycin, Polymyxin, Thimerosal, eggs or egg products? No    Flu vaccine given per order. Please see immunization tab. Risks and benefits explained. Current VIS given. Patient was seen today for HD Flu Vaccine and tolerated procedure well.

## 2021-01-01 ENCOUNTER — OFFICE VISIT (OUTPATIENT)
Dept: FAMILY MEDICINE CLINIC | Age: 71
End: 2021-01-01
Payer: COMMERCIAL

## 2021-01-01 ENCOUNTER — HOSPITAL ENCOUNTER (OUTPATIENT)
Dept: GENERAL RADIOLOGY | Age: 71
Discharge: HOME | End: 2021-11-24
Payer: COMMERCIAL

## 2021-01-01 ENCOUNTER — TELEPHONE (OUTPATIENT)
Dept: FAMILY MEDICINE CLINIC | Age: 71
End: 2021-01-01

## 2021-01-01 ENCOUNTER — OFFICE VISIT (OUTPATIENT)
Dept: FAMILY MEDICINE CLINIC | Age: 71
End: 2021-01-01

## 2021-01-01 ENCOUNTER — HOSPITAL ENCOUNTER (OUTPATIENT)
Age: 71
Discharge: HOME | End: 2021-11-24
Payer: COMMERCIAL

## 2021-01-01 VITALS
SYSTOLIC BLOOD PRESSURE: 112 MMHG | TEMPERATURE: 97.3 F | BODY MASS INDEX: 31.4 KG/M2 | OXYGEN SATURATION: 97 % | HEART RATE: 72 BPM | HEIGHT: 68 IN | DIASTOLIC BLOOD PRESSURE: 52 MMHG | WEIGHT: 207.2 LBS

## 2021-01-01 VITALS
DIASTOLIC BLOOD PRESSURE: 72 MMHG | HEIGHT: 68 IN | TEMPERATURE: 97.8 F | BODY MASS INDEX: 32.13 KG/M2 | SYSTOLIC BLOOD PRESSURE: 140 MMHG | WEIGHT: 212 LBS | HEART RATE: 65 BPM

## 2021-01-01 VITALS
OXYGEN SATURATION: 98 % | HEART RATE: 53 BPM | TEMPERATURE: 97 F | SYSTOLIC BLOOD PRESSURE: 128 MMHG | HEIGHT: 68 IN | WEIGHT: 217 LBS | DIASTOLIC BLOOD PRESSURE: 64 MMHG | BODY MASS INDEX: 32.89 KG/M2

## 2021-01-01 DIAGNOSIS — F41.9 ANXIETY: ICD-10-CM

## 2021-01-01 DIAGNOSIS — I77.9 BILATERAL CAROTID ARTERY DISEASE, UNSPECIFIED TYPE (HCC): ICD-10-CM

## 2021-01-01 DIAGNOSIS — E78.5 HYPERLIPIDEMIA, UNSPECIFIED HYPERLIPIDEMIA TYPE: ICD-10-CM

## 2021-01-01 DIAGNOSIS — E11.8 TYPE 2 DIABETES MELLITUS WITH COMPLICATION, WITH LONG-TERM CURRENT USE OF INSULIN (HCC): Primary | ICD-10-CM

## 2021-01-01 DIAGNOSIS — K21.9 GASTROESOPHAGEAL REFLUX DISEASE WITHOUT ESOPHAGITIS: ICD-10-CM

## 2021-01-01 DIAGNOSIS — I10 ESSENTIAL HYPERTENSION: Chronic | ICD-10-CM

## 2021-01-01 DIAGNOSIS — M54.31 RIGHT SIDED SCIATICA: Primary | ICD-10-CM

## 2021-01-01 DIAGNOSIS — E11.649 HYPOGLYCEMIA DUE TO TYPE 2 DIABETES MELLITUS (HCC): ICD-10-CM

## 2021-01-01 DIAGNOSIS — E66.9 OBESITY (BMI 30.0-34.9): ICD-10-CM

## 2021-01-01 DIAGNOSIS — E78.2 HYPERLIPIDEMIA, MIXED: Chronic | ICD-10-CM

## 2021-01-01 DIAGNOSIS — Z23 NEED FOR INFLUENZA VACCINATION: ICD-10-CM

## 2021-01-01 DIAGNOSIS — Z79.4 TYPE 2 DIABETES MELLITUS WITH COMPLICATION, WITH LONG-TERM CURRENT USE OF INSULIN (HCC): Primary | ICD-10-CM

## 2021-01-01 DIAGNOSIS — I20.9 ANGINA PECTORIS, UNSPECIFIED (HCC): ICD-10-CM

## 2021-01-01 DIAGNOSIS — Z23 NEED FOR SHINGLES VACCINE: ICD-10-CM

## 2021-01-01 DIAGNOSIS — R63.5 WEIGHT GAIN: ICD-10-CM

## 2021-01-01 DIAGNOSIS — M54.31 RIGHT SIDED SCIATICA: ICD-10-CM

## 2021-01-01 LAB
A/G RATIO: 1.3 (ref 1.1–2.2)
ALBUMIN SERPL-MCNC: 4 G/DL (ref 3.4–5)
ALP BLD-CCNC: 68 U/L (ref 40–129)
ALT SERPL-CCNC: 14 U/L (ref 10–40)
ANION GAP SERPL CALCULATED.3IONS-SCNC: 9 MMOL/L (ref 3–16)
AST SERPL-CCNC: 18 U/L (ref 15–37)
BILIRUB SERPL-MCNC: 0.3 MG/DL (ref 0–1)
BUN BLDV-MCNC: 21 MG/DL (ref 7–20)
CALCIUM SERPL-MCNC: 9.1 MG/DL (ref 8.3–10.6)
CHLORIDE BLD-SCNC: 103 MMOL/L (ref 99–110)
CHOLESTEROL, TOTAL: 181 MG/DL (ref 0–199)
CO2: 27 MMOL/L (ref 21–32)
CREAT SERPL-MCNC: 0.9 MG/DL (ref 0.8–1.3)
CREATININE URINE: 27 MG/DL (ref 39–259)
DIABETIC RETINOPATHY: NEGATIVE
GFR AFRICAN AMERICAN: >60
GFR NON-AFRICAN AMERICAN: >60
GLOBULIN: 3.1 G/DL
GLUCOSE BLD-MCNC: 59 MG/DL (ref 70–99)
HBA1C MFR BLD: 6.6 %
HBA1C MFR BLD: 6.9 %
HDLC SERPL-MCNC: 58 MG/DL (ref 40–60)
LDL CHOLESTEROL CALCULATED: 111 MG/DL
MICROALBUMIN UR-MCNC: <1.2 MG/DL
MICROALBUMIN/CREAT UR-RTO: ABNORMAL MG/G (ref 0–30)
POTASSIUM SERPL-SCNC: 4.3 MMOL/L (ref 3.5–5.1)
SODIUM BLD-SCNC: 139 MMOL/L (ref 136–145)
TOTAL PROTEIN: 7.1 G/DL (ref 6.4–8.2)
TRIGL SERPL-MCNC: 58 MG/DL (ref 0–150)
VLDLC SERPL CALC-MCNC: 12 MG/DL

## 2021-01-01 PROCEDURE — G8417 CALC BMI ABV UP PARAM F/U: HCPCS | Performed by: FAMILY MEDICINE

## 2021-01-01 PROCEDURE — 72100 X-RAY EXAM L-S SPINE 2/3 VWS: CPT

## 2021-01-01 PROCEDURE — 4040F PNEUMOC VAC/ADMIN/RCVD: CPT | Performed by: FAMILY MEDICINE

## 2021-01-01 PROCEDURE — G8427 DOCREV CUR MEDS BY ELIG CLIN: HCPCS | Performed by: FAMILY MEDICINE

## 2021-01-01 PROCEDURE — 1123F ACP DISCUSS/DSCN MKR DOCD: CPT | Performed by: FAMILY MEDICINE

## 2021-01-01 PROCEDURE — 1036F TOBACCO NON-USER: CPT | Performed by: FAMILY MEDICINE

## 2021-01-01 PROCEDURE — 99214 OFFICE O/P EST MOD 30 MIN: CPT | Performed by: FAMILY MEDICINE

## 2021-01-01 PROCEDURE — 3017F COLORECTAL CA SCREEN DOC REV: CPT | Performed by: FAMILY MEDICINE

## 2021-01-01 PROCEDURE — 83036 HEMOGLOBIN GLYCOSYLATED A1C: CPT | Performed by: FAMILY MEDICINE

## 2021-01-01 PROCEDURE — 90694 VACC AIIV4 NO PRSRV 0.5ML IM: CPT | Performed by: FAMILY MEDICINE

## 2021-01-01 PROCEDURE — 2022F DILAT RTA XM EVC RTNOPTHY: CPT | Performed by: FAMILY MEDICINE

## 2021-01-01 PROCEDURE — G0108 DIAB MANAGE TRN  PER INDIV: HCPCS | Performed by: FAMILY MEDICINE

## 2021-01-01 PROCEDURE — G8484 FLU IMMUNIZE NO ADMIN: HCPCS | Performed by: FAMILY MEDICINE

## 2021-01-01 PROCEDURE — 3044F HG A1C LEVEL LT 7.0%: CPT | Performed by: FAMILY MEDICINE

## 2021-01-01 PROCEDURE — 90471 IMMUNIZATION ADMIN: CPT | Performed by: FAMILY MEDICINE

## 2021-01-01 RX ORDER — CITALOPRAM 40 MG/1
TABLET ORAL
Qty: 30 TABLET | Refills: 0 | OUTPATIENT
Start: 2021-01-01

## 2021-01-01 RX ORDER — BACLOFEN 10 MG/1
10 TABLET ORAL 3 TIMES DAILY PRN
Qty: 30 TABLET | Refills: 1 | Status: SHIPPED | OUTPATIENT
Start: 2021-01-01

## 2021-01-01 RX ORDER — CITALOPRAM 20 MG/1
20 TABLET ORAL DAILY
Qty: 90 TABLET | Refills: 1 | Status: SHIPPED | OUTPATIENT
Start: 2021-01-01

## 2021-01-01 RX ORDER — ROSUVASTATIN CALCIUM 20 MG/1
20 TABLET, COATED ORAL DAILY
Qty: 30 TABLET | Refills: 5 | Status: SHIPPED | OUTPATIENT
Start: 2021-01-01 | End: 2021-01-01 | Stop reason: SDUPTHER

## 2021-01-01 RX ORDER — CITALOPRAM 40 MG/1
40 TABLET ORAL DAILY
Qty: 30 TABLET | Refills: 11 | Status: SHIPPED | OUTPATIENT
Start: 2021-01-01 | End: 2021-01-01 | Stop reason: DRUGHIGH

## 2021-01-01 RX ORDER — LOSARTAN POTASSIUM 25 MG/1
25 TABLET ORAL NIGHTLY
Qty: 30 TABLET | Refills: 5 | Status: SHIPPED | OUTPATIENT
Start: 2021-01-01

## 2021-01-01 RX ORDER — LOSARTAN POTASSIUM 25 MG/1
25 TABLET ORAL NIGHTLY
Qty: 30 TABLET | Refills: 5 | Status: SHIPPED | OUTPATIENT
Start: 2021-01-01 | End: 2021-01-01 | Stop reason: SDUPTHER

## 2021-01-01 RX ORDER — ROSUVASTATIN CALCIUM 20 MG/1
20 TABLET, COATED ORAL DAILY
Qty: 30 TABLET | Refills: 5 | Status: SHIPPED | OUTPATIENT
Start: 2021-01-01

## 2021-01-01 RX ORDER — ZOSTER VACCINE RECOMBINANT, ADJUVANTED 50 MCG/0.5
0.5 KIT INTRAMUSCULAR ONCE
Qty: 0.5 ML | Refills: 0 | Status: SHIPPED | OUTPATIENT
Start: 2021-01-01 | End: 2021-01-01

## 2021-01-01 RX ORDER — ESOMEPRAZOLE MAGNESIUM 40 MG/1
CAPSULE, DELAYED RELEASE ORAL
Qty: 30 CAPSULE | Refills: 0 | OUTPATIENT
Start: 2021-01-01

## 2021-01-01 RX ORDER — ESOMEPRAZOLE MAGNESIUM 40 MG/1
40 CAPSULE, DELAYED RELEASE ORAL DAILY
Qty: 30 CAPSULE | Refills: 11 | Status: SHIPPED | OUTPATIENT
Start: 2021-01-01

## 2021-01-01 ASSESSMENT — ENCOUNTER SYMPTOMS
SHORTNESS OF BREATH: 0
BOWEL INCONTINENCE: 0
BACK PAIN: 1

## 2021-01-01 ASSESSMENT — PATIENT HEALTH QUESTIONNAIRE - PHQ9
SUM OF ALL RESPONSES TO PHQ QUESTIONS 1-9: 0
2. FEELING DOWN, DEPRESSED OR HOPELESS: 0
1. LITTLE INTEREST OR PLEASURE IN DOING THINGS: 0

## 2021-03-17 NOTE — PATIENT INSTRUCTIONS
Need help scheduling your covid vaccine? Call [facility] hotline: [phone] or go to [email or web address]    PLEASE BRING YOUR MEDICATIONS TO ALL APPOINTMENTS    The diagnoses and medications listed in this after visit summary may not be accurate at the time of check out. Please check MY CHART in 28-48 hours for possible corrections. Late cancellation policy: So that we can better accommodate people who are sick, please give our office 24 hour notice for an appointment cancellation. Thank you. Missed appointments: Your care is very important to us. It is important that you keep your scheduled appointments. Multiple missed appointments will lead to a dismissal from the office. Later arrival policy: If you are more than 10 minutes late for your appointment, you will be asked to reschedule. Please allow 5-7 business days for paperwork to be processed. It is important that you check your MY Chart messages, as they include appointment reminders, test results, and other important information. If you have forgotten your password, please call [phone]. HERE ARE SOME LIFE CHANGING TIPS      1. Take your blood pressure medications at bedtime. This reduces your chance of cardiovascular event by half  2. Fever in kids: It's best to give both Tylenol and Ibuprofen at the same time rather than staggering them which is confusing  3. Follow these tips to reduce childhood obesity: Reduce unnecessary exposure to antibiotics, consume whole milk instead of skim milk, watch public TV instead of regular TV (less exposure to junk food commercials), and reduce traumatic experiences. 4. 1 egg per day is good for your heart  5. Alternate day fasting does promote weight loss. 6. Skipping breakfast increases your risk of obesity  7. Artificially sweetened drinks increase all cause mortality (strokes, BMI, cardiovascular)  8. Kale consumption can reduce onset of dementia  9. Walking at least 8000 steps per day and resistance exercise 2-3 x per week are good for your heart  10. Covid 19:  Wearing gloves is not that helpful  Having low vitamin D levels increases risk of infection (take 2-4000 units of D3 per day until our covid crisis is resolved)  11. Brushing teeth 3 times per day can decrease chance of getting diabetes              Recombinant Zoster (Shingles) Vaccine, RZV: What you need to know  Why get vaccinated? Shingles (also called herpes zoster, or just zoster) is a painful skin rash, often with blisters. Shingles is caused by the varicella zoster virus, the same virus that causes chickenpox. After you have chickenpox, the virus stays in your body and can cause shingles later in life. You can't catch shingles from another person. However, a person who has never had chickenpox (or chickenpox vaccine) could get chickenpox from someone with shingles. A shingles rash usually appears on one side of the face or body and heals within 2 to 4 weeks. Its main symptom is pain, which can be severe. Other symptoms can include fever, headache, chills, and upset stomach. Very rarely, a shingles infection can lead to pneumonia, hearing problems, blindness, brain inflammation (encephalitis), or death. For about 1 person in 5, severe pain can continue even long after the rash has cleared up. This long-lasting pain is called post-herpetic neuralgia (PHN). Shingles is far more common in people 48years of age and older than in younger people, and the risk increases with age. It is also more common in people whose immune system is weakened because of a disease such as cancer, or by drugs such as steroids or chemotherapy. At least 1 million people a year in the [facility] get shingles. Shingles vaccine (recombinant)  Recombinant shingles vaccine was approved by FDA in 2017 for the prevention of shingles. In clinical trials, it was more than 90% effective in preventing shingles.  It can also reduce the likelihood of PHN. Two doses, 2 to 6 months apart, are recommended for adults 48 and older. This vaccine is also recommended for people who have already gotten the live shingles vaccine (Zostavax). There is no live virus in this vaccine. Some people should not get this vaccine  Tell your vaccine provider if you:  · Have any severe, life-threatening allergies. A person who has ever had a life-threatening allergic reaction after a dose of recombinant shingles vaccine, or has a severe allergy to any component of this vaccine, may be advised not to be vaccinated. Ask your health care provider if you want information about vaccine components. · Are pregnant or breastfeeding. There is not much information about use of recombinant shingles vaccine in pregnant or nursing women. Your healthcare provider might recommend delaying vaccination. · Are not feeling well. If you have a mild illness, such as a cold, you can probably get the vaccine today. If you are moderately or severely ill, you should probably wait until you recover. Your doctor can advise you. Risks of a vaccine reaction  With any medicine, including vaccines, there is a chance of reactions. After recombinant shingles vaccination, a person might experience:  · Pain, redness, soreness, or swelling at the site of the injection  · Headache, muscle aches, fever, shivering, fatigue  In clinical trials, most people got a sore arm with mild or moderate pain after vaccination, and some also had redness and swelling where they got the shot. Some people felt tired, had muscle pain, a headache, shivering, fever, stomach pain, or nausea. About 1 out of 6 people who got recombinant zoster vaccine experienced side effects that prevented them from doing regular activities. Symptoms went away on their own in about 2 to 3 days. Side effects were more common in younger people.   You should still get the second dose of recombinant zoster vaccine even if you had one of these reactions after the first dose. Other things that could happen after this vaccine:  · People sometimes faint after medical procedures, including vaccination. Sitting or lying down for about 15 minutes can help prevent fainting and injuries caused by a fall. Tell your provider if you feel dizzy or have vision changes or ringing in the ears. · Some people get shoulder pain that can be more severe and longer-lasting than routine soreness that can follow injections. This happens very rarely. · Any medication can cause a severe allergic reaction. Such reactions to a vaccine are estimated at about 1 in a million doses, and would happen within a few minutes to a few hours after the vaccination. As with any medicine, there is a very remote chance of a vaccine causing a serious injury or death. The safety of vaccines is always being monitored. For more information, visit: www.cdc.gov/vaccinesafety/  What if there is a serious problem? What should I look for? · Look for anything that concerns you, such as signs of a severe allergic reaction, very high fever, or unusual behavior. Signs of a severe allergic reaction can include hives, swelling of the face and throat, difficulty breathing, a fast heartbeat, dizziness, and weakness. These would usually start a few minutes to a few hours after the vaccination. What should I do? · If you think it is a severe allergic reaction or other emergency that can't wait, call 9-1-1 or get to the nearest hospital. Otherwise, call your health care provider. · Afterward, the reaction should be reported to the Vaccine Adverse Event Reporting System (VAERS). Your doctor should file this report, or you can do it yourself through the VAERS website at www.vaers. hhs.gov, or by calling 0-482.385.8929. VAERS does not give medical advice. .  How can I learn more? · Ask your health care provider.  He or she can give you the vaccine package insert or suggest other sources of information. · Call your local or state health department. · Contact the Centers for Disease Control and Prevention (CDC):  ? Call 1-940.919.8370 (1-800-CDC-INFO) or  ? Visit CDC's website at www.cdc.gov/vaccines  Vaccine Information Statement (Interim)  Recombinant Zoster Vaccine  2/12/2018  Formerly [facility] and [facility] for Disease Control and Prevention  Many Vaccine Information Statements are available in [place] and other languages. See www.immunize.org/vis. Hojas de Información Sobre Vacunas están disponibles en Español y en muchos otros idiomas. Visite Luis Eduardo.si  Care instructions adapted under license by [facility] ([facility]). If you have questions about a medical condition or this instruction, always ask your healthcare professional. [address] any warranty or liability for your use of this information. CHECK OUT LASHA [facility]O [address]     Walking for Exercise: Care Instructions  Your Care Instructions    Walking is one of the easiest ways to get the exercise you need for good health. A brisk, 30-minute walk each day can help you feel better and have more energy. It can help you lower your risk of disease. Walking can help you keep your bones strong and your heart healthy. Check with your doctor before you start a walking plan if you have heart problems, other health issues, or you have not been active in a long time. Follow your doctor's instructions for safe levels of exercise. Follow-up care is a key part of your treatment and safety. Be sure to make and go to all appointments, and call your doctor if you are having problems. It's also a good idea to know your test results and keep a list of the medicines you take. How can you care for yourself at home? Getting started  · Start slowly and set a short-term goal. For example, walk for 5 or 10 minutes every day. · Bit by bit, increase the amount you walk every day.  Try for at least 30

## 2021-03-17 NOTE — PROGRESS NOTES
Patient ID: Jason Hyaes 1950    . Chief Complaint   Patient presents with    Diabetes    Hypertension    Hyperlipidemia         Diabetes  He presents for his follow-up diabetic visit. He has type 2 diabetes mellitus. His disease course has been fluctuating. Pertinent negatives for diabetes include no chest pain. There are no hypoglycemic complications. Symptoms are stable. Diabetic complications include heart disease. Current diabetic treatment includes insulin injections. He is compliant with treatment some of the time. His weight is stable. He is following a high fat/cholesterol (works at [employer].  ) diet. When asked about meal planning, he reported none. He has not had a previous visit with a dietitian. His home blood glucose trend is fluctuating minimally (when does not eat on time, has low sugar reading. has had low sugar readings when he is working on unloading the truck at work.  goes into the 40's). An ACE inhibitor/angiotensin II receptor blocker is being taken. Hyperlipidemia  This is a chronic problem. The current episode started more than 1 year ago. Pertinent negatives include no chest pain or shortness of breath. Current antihyperlipidemic treatment includes statins. Hypertension  This is a chronic problem. The problem is unchanged. The problem is controlled. Pertinent negatives include no chest pain, palpitations or shortness of breath. Past treatments include angiotensin blockers. The current treatment provides significant improvement. Gastroesophageal Reflux  He reports no chest pain. on Celexa. This is a chronic problem. Risk factors include obesity. He has tried a PPI for the symptoms. The treatment provided significant relief. Mental Health Problem  Primary symptoms comment: on Celexa. This is a chronic problem. The onset of the illness is precipitated by emotional stress. Additional symptoms of the illness do not include appetite change or unexpected weight change. Review of Systems   Constitutional: Negative for appetite change and unexpected weight change. Respiratory: Negative for shortness of breath. Cardiovascular: Negative for chest pain and palpitations. Patient Active Problem List   Diagnosis    Angina, class II ([address]    Abnormal nuclear cardiac imaging test    History of PTCA    Essential hypertension    Hyperlipidemia, mixed    Gastroesophageal reflux disease without esophagitis    Bilateral carotid artery disease (HCC)    Type 2 diabetes mellitus with complication, with long-term current use of insulin (HCC)    Anxiety       Past Surgical History:   Procedure Laterality Date    ADENOIDECTOMY      CARDIAC CATHETERIZATION      Heart cath with stent placed in back side of heart    COLONOSCOPY  02/25/2014    TOTAL KNEE ARTHROPLASTY Right 12/2015    Right knee replaced    TOTAL KNEE ARTHROPLASTY Left 07/2014    Left knee replaced    VEIN SURGERY Right 2015    Vein stripped in right leg       Family History   Problem Relation Age of Onset    Osteoarthritis Mother     Rheumatologic Disease Mother         Raynaud's/ Crest     Heart Disease Father     Asthma Father     Diabetes Father     Heart Failure Father     Seizures Brother     Cancer Maternal Aunt     Stroke Maternal Uncle     Diabetes Paternal Aunt     High Cholesterol Paternal Aunt     Rashes/Skin Problems Maternal Grandmother     Stroke Maternal Grandmother     Diabetes Paternal Grandmother     Heart Failure Paternal Grandfather        Current Outpatient Medications on File Prior to Visit   Medication Sig Dispense Refill    ondansetron (ZOFRAN ODT) 4 MG disintegrating tablet Take 1 tablet by mouth every 8 hours as needed for Nausea or Vomiting 10 tablet 0    glucose blood VI test strips (SVETA CONTOUR NEXT TEST) strip 1 each by In Vitro route 2 times daily As needed.       Omega 3-6-9 Fatty Acids (OMEGA 3-6-9 COMPLEX PO) Take by mouth      Cinnamon 500 MG CAPS Take 500 mg by mouth 2 times daily.  Multiple Vitamins-Minerals (MULTIVITAMIN PO) Take 1 tablet by mouth.  aspirin 81 MG tablet Take 81 mg by mouth daily. No current facility-administered medications on file prior to visit. Objective:         Physical Exam  Vitals signs and nursing note reviewed. Constitutional:       General: He is not in acute distress. Appearance: He is well-developed. He is obese. HENT:      Head: Normocephalic. Neck:      Thyroid: No thyromegaly. Trachea: No tracheal deviation. Cardiovascular:      Rate and Rhythm: Normal rate and regular rhythm. Pulses:           Dorsalis pedis pulses are 2+ on the right side and 2+ on the left side. Posterior tibial pulses are 2+ on the right side and 2+ on the left side. Heart sounds: S1 normal and S2 normal. No murmur. No gallop. Comments: Carotids without bruits  Pulmonary:      Effort: Pulmonary effort is normal. No respiratory distress. Breath sounds: Normal breath sounds. No wheezing or rales. Abdominal:      General: There is no distension. Palpations: Abdomen is soft. There is no mass. Tenderness: There is no abdominal tenderness. Musculoskeletal:      Right foot: Normal range of motion. No deformity. Left foot: Normal range of motion. No deformity. Comments: Feet without lesions     Feet:      Right foot:      Protective Sensation: 5 sites tested. 5 sites sensed. Skin integrity: No ulcer, blister, skin breakdown, erythema, warmth, callus or dry skin. Left foot:      Protective Sensation: 5 sites tested. 5 sites sensed. Skin integrity: No ulcer, blister, skin breakdown, erythema, warmth, callus or dry skin. Skin:     General: Skin is warm and dry. Neurological:      Mental Status: He is alert and oriented to person, place, and time.       Comments:      Psychiatric:         Behavior: Behavior normal.       Vitals:    03/17/21 1102   BP: [facility] GLUCOSE) 4 g chewable tablet           Plan:      Diabetes well controlled but having complications with low sugars. I suggest that he take 20 units twice a day on the days he knows he is going to be unloading the truck. On the other days he can take his regular amount of insulin    Bilateral carotid artery disease not really problematic    Hypertension stable. Continue current medication    Hyperlipidemia treated with Crestor    Continue the Celexa for his anxiety. Reminded to get shingles vaccine. Continue with weight loss        Return in about 6 months (around 9/17/2021) for DM, In office, Fasting.

## 2021-09-15 NOTE — PROGRESS NOTES
Patient ID: Debby Delaney 1950    . Chief Complaint   Patient presents with    Diabetes         Diabetes  He presents for his follow-up diabetic visit. He has type 2 diabetes mellitus. His disease course has been fluctuating. (Occasional, but they come back up) Symptoms are stable. Diabetic complications include heart disease. Current diabetic treatment includes insulin injections. He is compliant with treatment most of the time. His weight is stable. Diabetic current diet: works at [employer]. When asked about meal planning, he reported none. He has not had a previous visit with a dietitian. Home blood sugar record trend: when does not eat on time, has low sugar reading. has had low sugar readings when he is working on unloading the truck at work.  goes into the 40's. His overall blood glucose range is 110-130 mg/dl. An ACE inhibitor/angiotensin II receptor blocker is being taken. Hyperlipidemia  This is a chronic problem. The current episode started more than 1 year ago. Current antihyperlipidemic treatment includes statins. Hypertension  This is a chronic problem. The problem is unchanged. The problem is controlled. Past treatments include angiotensin blockers. The current treatment provides significant improvement. Gastroesophageal Reflux  on Celexa for chest pain that was related to anxiety. This is a chronic problem. Risk factors include obesity. He has tried a PPI for the symptoms. The treatment provided significant relief. Mental Health Problem  Primary symptoms comment: on Celexa for chest pain that was related to anxiety. This is a chronic problem. The onset of the illness is precipitated by emotional stress.        Review of Systems       Patient Active Problem List   Diagnosis    Angina, class II ([facility]    Abnormal nuclear cardiac imaging test    History of PTCA    Essential hypertension    Hyperlipidemia, mixed    Gastroesophageal reflux disease without esophagitis    Bilateral carotid artery disease (HCC)    Type 2 diabetes mellitus with complication, with long-term current use of insulin (HCC)    Anxiety       Past Surgical History:   Procedure Laterality Date    ADENOIDECTOMY      CARDIAC CATHETERIZATION      Heart cath with stent placed in back side of heart    COLONOSCOPY  02/25/2014    TOTAL KNEE ARTHROPLASTY Right 12/2015    Right knee replaced    TOTAL KNEE ARTHROPLASTY Left 07/2014    Left knee replaced    VEIN SURGERY Right 2015    Vein stripped in right leg       Family History   Problem Relation Age of Onset    Osteoarthritis Mother     Rheumatologic Disease Mother         Raynaud's/ Crest     Heart Disease Father     Asthma Father     Diabetes Father     Heart Failure Father     Seizures Brother     Cancer Maternal Aunt     Stroke Maternal Uncle     Diabetes Paternal Aunt     High Cholesterol Paternal Aunt     Rashes/Skin Problems Maternal Grandmother     Stroke Maternal Grandmother     Diabetes Paternal Grandmother     Heart Failure Paternal Grandfather        Current Outpatient Medications on File Prior to Visit   Medication Sig Dispense Refill    esomeprazole (NEXIUM) 40 MG delayed release capsule Take 1 capsule by mouth daily 30 capsule 11    Insulin Syringe-Needle U-100 ([employer] INSULIN SYR 1CC/30G) 30G X 5/16\" 1 ML MISC 1 each by Does not apply route 2 times daily 100 each 11    glucose ([facility] GLUCOSE) 4 g chewable tablet Take 4 tablets by mouth as needed for Low blood sugar 60 tablet 0    glucose blood VI test strips (SVETA CONTOUR NEXT TEST) strip 1 each by In Vitro route 2 times daily As needed.  Omega 3-6-9 Fatty Acids (OMEGA 3-6-9 COMPLEX PO) Take by mouth      Cinnamon 500 MG CAPS Take 500 mg by mouth 2 times daily.  Multiple Vitamins-Minerals (MULTIVITAMIN PO) Take 1 tablet by mouth.  aspirin 81 MG tablet Take 81 mg by mouth daily. No current facility-administered medications on file prior to visit. Objective:         Physical Exam  Vitals and nursing note reviewed. Constitutional:       General: He is not in acute distress. Appearance: He is well-developed. He is obese. HENT:      Head: Normocephalic. Neck:      Thyroid: No thyromegaly. Trachea: No tracheal deviation. Cardiovascular:      Rate and Rhythm: Normal rate and regular rhythm. Pulses:           Dorsalis pedis pulses are 2+ on the right side and 2+ on the left side. Posterior tibial pulses are 2+ on the right side and 2+ on the left side. Heart sounds: S1 normal and S2 normal. No murmur heard. No gallop. Pulmonary:      Effort: Pulmonary effort is normal. No respiratory distress. Breath sounds: Normal breath sounds. No wheezing or rales. Abdominal:      General: There is no distension. Palpations: Abdomen is soft. There is no mass. Tenderness: There is no abdominal tenderness. Musculoskeletal:      Right foot: Normal range of motion. No deformity. Left foot: Normal range of motion. No deformity. Feet:       Comments: Feet without lesions     Feet:      Right foot:      Protective Sensation: 5 sites tested. 5 sites sensed. Skin integrity: No ulcer, blister, skin breakdown, erythema, warmth, callus or dry skin. Toenail Condition: Right toenails are abnormally thick. Fungal disease present. Left foot:      Protective Sensation: 5 sites tested. 5 sites sensed. Skin integrity: No ulcer, blister, skin breakdown, erythema, warmth, callus or dry skin. Skin:     General: Skin is warm and dry. Neurological:      Mental Status: He is alert and oriented to person, place, and time. Comments:      Psychiatric:         Attention and Perception: He is attentive. Mood and Affect: Mood is anxious. Speech: Speech normal.         Behavior: Behavior normal.         Thought Content:  Thought content normal.         Judgment: Judgment normal. treated: Yes      HDL Cholesterol: 58 mg/dL      Total Cholesterol: 181 mg/dL  Lab Review   No visits with results within 2 Month(s) from this visit. Latest known visit with results is:   Abstract on 03/17/2021   Component Date Value    Diabetic Retinopathy 03/17/2021 Negative            Assessment:       Diagnosis Orders   1. Type 2 diabetes mellitus with complication, with long-term current use of insulin ([facility])  POCT glycosylated hemoglobin (Hb A1C)    Lipid Panel    Comprehensive Metabolic Panel    Microalbumin / Creatinine Urine Ratio    insulin 70-30 (NOVOLIN 70/30) (70-30) 100 UNIT per ML injection vial    [facility] (Ambulatory)   2. Need for shingles vaccine  zoster recombinant adjuvanted vaccine [facility]) 50 MCG/0.5ML SUSR injection   3. Essential hypertension  losartan (COZAAR) 25 MG tablet   4. Gastroesophageal reflux disease without esophagitis     5. Hyperlipidemia, mixed     6. Obesity (BMI 30.0-34.9)     7. Weight gain     8. Need for influenza vaccination  INFLUENZA, QUADV, ADJUVANTED, 65 YRS =, IM, PF, PREFILL SYR, 0.5ML (FLUAD)   9. Hyperlipidemia, unspecified hyperlipidemia type  rosuvastatin (CRESTOR) 20 MG tablet   10. Anxiety  citalopram (CELEXA) 20 MG tablet   11. Angina pectoris, unspecified ([address]             Plan:      Diabetes with good A1c reading. However according to the latest literature, his A1c may be too low. Therefore I recommend decreasing his insulin intake. He understands. He will now take 28 units in the morning and 20 units in the evening. We will recheck him in 6 months    Per the literature, patient may be on too high of a Celexa dose. This is age-related. Therefore I like for him to come down to the 20 mg. Since he has plenty of 40 mg tablets, will have him alternate for the next 2 weeks between the 40 mg and half a tablet of the 40 mg.   After that he can continue to take the half a tablet 20 mg.  I have printed him a prescription for a refill. Concerned about his weight gain. Of asked him to work on weight reduction. He understands    CAD stable on the crestor    Recommended to get shingles vaccine. Return in about 25 weeks (around 3/9/2022) for HTN, Mental health, DM.

## 2021-09-15 NOTE — PATIENT INSTRUCTIONS
Need help scheduling your covid vaccine? [address] [phone]       PLEASE BRING YOUR MEDICATIONS TO ALL APPOINTMENTS    The diagnoses and medications listed in this after visit summary may not be accurate at the time of check out. Please check MY CHART in 28-48 hours for possible corrections. Late cancellation policy: So that we can better accommodate people who are sick, please give our office 24 hour notice for an appointment cancellation. Thank you. Missed appointments: Your care is very important to us. It is important that you keep your scheduled appointments. Multiple missed appointments will lead to a dismissal from the office. Later arrival policy: If you are more than 10 minutes late for your appointment, you will be asked to re-schedule. Please allow 5-7 business days for paperwork to be processed. It is important that you check your MY Chart messages, as they include appointment reminders, test results, and other important information. If you have forgotten your password, please call [phone]. HERE ARE SOME LIFE CHANGING TIPS      1. Take your blood pressure medications at bedtime. This reduces your chance of cardiovascular event by half  2. Fever in kids:  Give both Tylenol and Ibuprofen at the same time rather than staggering them which is confusing  3. Follow these tips to reduce childhood obesity: Reduce unnecessary exposure to antibiotics, consume whole milk instead of skim milk, watch public TV instead of regular TV (less exposure to junk food commercials), and reduce traumatic experiences. 4. 1 egg per day is good for your heart  5. Alternate day fasting does promote weight loss. Skipping breakfast increases your risk of obesity  6. Artificially sweetened drinks increase all cause mortality (strokes, body mass index, cardiovascular disease)  7. Kale consumption can reduce onset of dementia  8.  Walking at least 8000 steps per day and resistance exercise 2-3 x per week are good for your heart  9. Brushing teeth 3 times per day can decrease chance of getting diabetes  10. Treatment of acute cough: honey for kids over the age of 3. Naproxen 500 mg 3 times per day in adults. Otherwise NOTHING ELSE WORKS for cough  11. Antibiotic use is associated with a lifetime increased risk of breast cancer and heart disease.

## 2021-11-04 NOTE — PROGRESS NOTES
[facility] Diabetes Health  Diabetes Self-Management Education & Support Program    Reason for Referral: hypoglycemia   Referral Source: Enoch Fabian MD  Services requested: Individual & group SELF MGT & DIET education, assessment & referring provider contacted to manage medications    ASSESSMENT    From my perspective, the participant would benefit from [facility] specifically related to Healthy Eating, Monitoring, Taking Medications, Healthy Coping, Reducing Risks and Problem Solving. Will adapt DSMES program to build on participant's current knowledge as noted in the Diabetes Skills, Confidence, and Preparedness Index. During the program, we will focus on providing DSMES that specifically addresses participant's interest in Healthy Eating and Monitoring, as shown by their reported readiness to change. The participant would be best served by attending individual sessions. Diabetes Self-Management Education Follow-up Visit: 11/18/2021       Clinical Presentation  Faye Carias is a 73 y.o.  male referred for diabetes self-management education. Participant has Type 2 uncontrolled for 20 years approximately. Family history positive for diabetes.  Patient reports DSMES services was not received in the past. Most recent A1c value:   Lab Results   Component Value Date    LABA1C 6.6 09/15/2021       Diabetes-related medical history:  Acute complications  hypoglycemia  Neurological complications  without complications  Microvascular disease  without complications  Macrovascular disease  Without complications  Other associated conditions     hypertension    Diabetes-related medications:  Current dosing: Novolin 70/30 35u in AM, 20-25u in PM    Blood Pressure Management  Losartan 25mg nightly    Lipid Management  Rosuvastatin 20mg daily    Clot Prevention  Aspirin 81mg    Learning Assessment  Learning objectives Educator assessment (11/4/2021)   Diabetes Disease Process  The participant can A) describe diabetes in basic terms;   B) state the type of diabetes they have; &   C) state accepted blood glucose targets. Healthy Eating  The participant can   A) identify carbohydrate foods; &   B) accurately read food labels. Being Active  The participant can  A) state the benefits of physical activity;  B) report their current PA practices;  C) identify PA they would consider incorporating in their lives; &  D) develop an implementation plan. Monitoring  The participant can  A) operate their blood glucose meter; &  B) describe how they log their blood glucoses to share with their provider. Taking Medications  The participant can  A) name their diabetes medications;  B) state the purpose and dose;  C) note side effects; &  D) describe proper storage, disposal & transport (if appropriate). Healthy Coping  The participant can    A) describe their response to diabetes diagnosis; B) describe their specific coping mechanisms;  C) identify supportive people and/or other resources that positively support their diabetes self-care and health. Reducing Risks  The participant can describe the preventive measures used by providers to promote health and prevent diabetes complications. Problem Solving  The participant can   A) identify signs, symptoms & treatment of hypoglycemia;   B) identify signs, symptoms & treatment of hyperglycemia;  C) describe their sick day plan; &  D) identify BG patterns to discuss with their provider.        Yes  Yes  Yes        Yes  No        Yes  Yes  Yes  No        Yes  Yes        Yes  Yes  Yes  Yes        Yes  No  No        No          Yes  No  No  No     Characteristics to Learning   Barriers to Learning   [] Cognitive loss  [] Mental retardation       [] Psychiatric disorder  [] Visually impaired  [] Hearing loss                 [] Low literacy  [] Low health literacy  [] Language  [] Functional limitation  [] Pain   [] Financial  [] Transportation  [x] None your diabetes medications do? No    How often do you miss doses of your diabetes medications? Never    Can you afford your diabetes medications? Yes   Would benefit from [facility] related to Taking Medication: Yes      Takes medications consistently to receive full benefit: Yes      Stage of change: maintenance - has made change and is trying and/or practicing different alternative behaviors    Healthy Coping   Current state  Diabetes Skills, Confidence and Preparedness Index: Total score: 5.2  Skills: 6.0  Confidence: 5.7  Preparedness: 5.6   Would benefit from DSMES related to Healthy Coping: Yes      Identifies specific people, organizations,etc, that actively support their diabetes self-care efforts:   No      Stage of change: action - ready to set action plan and implement      Reducing Risks  Current state  Vaccines:  Influenza: 2021    Pneumococcal: 4/2019    Hepatitis: No results found     COVID19: due for booster      Examinations:  Eye Exam: sees Dr. Alyx Watt, needs to make an appt    Dental exam: Patient declines to follow up with dental provider    Foot exam: Patient last seen podiatrist on:Dr. Elroy Abdi just a couple weeks ago. Heart Protection:  BP Readings from Last 3 Encounters:   09/15/21 128/64   03/17/21 (!) 112/52   08/06/20 (!) 145/70         LDL Calculated (mg/dL)   Date Value   09/15/2021 111 (H)     Triglycerides (mg/dL)   Date Value   09/15/2021 58     HDL (mg/dL)   Date Value   09/15/2021 58     Cholesterol, Total (mg/dL)   Date Value   09/15/2021 181         Kidney Protection:  Microalbumin, Random Urine (mg/dL)   Date Value   09/15/2021 <1.20          Would benefit from [facility] related to Reducing Risks: Yes      Actively participates in decision-making with provider regarding secondary prevention:  Yes        Stage of change: action - ready to set action plan and implement. Problem Solving  Current state  Hypoglycemia Management:  What are signs and symptoms of hypoglycemia that you experience? sweaty    How do you prevent hypoglycemia? Take medications as directed, Eat regularly and pt educated on prevention    How do you treat hypoglycemia? 3-4 glucose tabs    Hyperglycemia Management:  What are signs and symptoms of hyperglycemia that you experience? excessive thirst, excessive urination, very hungry, sleepy/tired, vision changes, recurrent infections, slow to heal and Patient is educated on these s/s of hyperglycemia    How can you prevent hyperglycemia? Avoid skipping/missing doses of medications, Avoid eating more than usual, Avoid eating more carbs/concentrated sweets, Be more physically active, Use a sick day plan, Manage stress and Patient is educated on these suggested preventative measures    Sick Day Management:  What do you do differently on sick days? call MD if glucose above 400    Pattern Management:  Do you notice blood glucose patterns when you look at the readings in your meter or logbook? Yes    How do you use the blood glucose readings from your meter or logbook? Pt notifies provider of unusual readings.      Would benefit from [facility] related to Problem Solving: Yes      Articulates appropriate strategies to address hypoglycemia, hyperglycemia, sick day care and BG pattern: No    Stage of change: action - ready to set action plan and implement    Note: Content derived from the American Association of Diabetes Educators' Diabetes Education Curriculum: A Guide to Successful Self-Management (2nd edition)      Jose Peters RN on 11/4/2021 at 3:14 PM    Time in appointment: 60 minutes

## 2021-11-24 NOTE — PATIENT INSTRUCTIONS
Low Back Pain: Exercises  Your Care Instructions  Here are some examples of typical rehabilitation exercises for your condition. Start each exercise slowly. Ease off the exercise if you start to have pain. Your doctor or physical therapist will tell you when you can start these exercises and which ones will work best for you. How to do the exercises  Press-up    1. Lie on your stomach, supporting your body with your forearms. 2. Press your elbows down into the floor to raise your upper back. As you do this, relax your stomach muscles and allow your back to arch without using your back muscles. As your press up, do not let your hips or pelvis come off the floor. 3. Hold for 15 to 30 seconds, then relax. 4. Repeat 2 to 4 times. Alternate arm and leg (bird dog) exercise    1. Start on the floor, on your hands and knees. 2. Tighten your belly muscles. 3. Raise one leg off the floor, and hold it straight out behind you. Be careful not to let your hip drop down, because that will twist your trunk. 4. Hold for about 6 seconds, then lower your leg and switch to the other leg. 5. Repeat 8 to 12 times on each leg. 6. Over time, work up to holding for 10 to 30 seconds each time. 7. If you feel stable and secure with your leg raised, try raising the opposite arm straight out in front of you at the same time. Knee-to-chest exercise    1. Lie on your back with your knees bent and your feet flat on the floor. 2. Bring one knee to your chest, keeping the other foot flat on the floor (or keeping the other leg straight, whichever feels better on your lower back). 3. Keep your lower back pressed to the floor. Hold for at least 15 to 30 seconds. 4. Relax, and lower the knee to the starting position. 5. Repeat with the other leg. Repeat 2 to 4 times with each leg. 6. To get more stretch, put your other leg flat on the floor while pulling your knee to your chest.     Curl-ups    1.  Lie on the floor on your back with your knees bent at a 90-degree angle. Your feet should be flat on the floor, about 12 inches from your buttocks. 2. Cross your arms over your chest. If this bothers your neck, try putting your hands behind your neck (not your head), with your elbows spread apart. 3. Slowly tighten your belly muscles and raise your shoulder blades off the floor. 4. Keep your head in line with your body, and do not press your chin to your chest.  5. Hold this position for 1 or 2 seconds, then slowly lower yourself back down to the floor. 6. Repeat 8 to 12 times. Pelvic tilt exercise    1. Lie on your back with your knees bent. 2. \"Brace\" your stomach. This means to tighten your muscles by pulling in and imagining your belly button moving toward your spine. You should feel like your back is pressing to the floor and your hips and pelvis are rocking back. 3. Hold for about 6 seconds while you breathe smoothly. 4. Repeat 8 to 12 times. Heel dig bridging    1. Lie on your back with both knees bent and your ankles bent so that only your heels are digging into the floor. Your knees should be bent about 90 degrees. 2. Then push your heels into the floor, squeeze your buttocks, and lift your hips off the floor until your shoulders, hips, and knees are all in a straight line. 3. Hold for about 6 seconds as you continue to breathe normally, and then slowly lower your hips back down to the floor and rest for up to 10 seconds. 4. Do 8 to 12 repetitions. Hamstring stretch in doorway    1. Lie on your back in a doorway, with one leg through the open door. 2. Slide your leg up the wall to straighten your knee. You should feel a gentle stretch down the back of your leg. 3. Hold the stretch for at least 15 to 30 seconds. Do not arch your back, point your toes, or bend either knee. Keep one heel touching the floor and the other heel touching the wall. 4. Repeat with your other leg. 5. Do 2 to 4 times for each leg.      Hip appointment cancellation. Thank you. Missed appointments: Your care is very important to us. It is important that you keep your scheduled appointments. Multiple missed appointments will lead to a dismissal from the office. Later arrival policy: If you are more than 10 minutes late for your appointment, you will be asked to re-schedule. Please allow 5-7 business days for paperwork to be processed. It is important that you check your MY Chart messages, as they include appointment reminders, test results, and other important information. If you have forgotten your password, please call [phone]. HERE ARE SOME LIFE CHANGING TIPS      1. Take your blood pressure medications at bedtime to reduce your chance of heart attack or stroke  2. Fever in kids:  Give both Tylenol and Ibuprofen at the same time rather than staggering them   3. Follow these tips to reduce childhood obesity: Reduce unnecessary exposure to antibiotics, consume whole milk instead of skim milk, watch public TV instead of regular TV (less exposure to junk food commercials), and reduce traumatic experiences. 4. 1 egg per day is good for your heart  5. Alternate day fasting does promote weight loss. Skipping breakfast increases your risk of obesity  6. Artificially sweetened drinks increase all cause mortality (strokes, body mass index, cardiovascular disease)  7. Kale consumption can reduce onset of dementia  8. Walking at least 8000 steps per day and resistance exercise 2-3 x per week are good for your heart  9. Brushing teeth 3 times per day can decrease chance of getting diabetes  10. Antibiotic use is associated with a lifetime increased risk of breast cancer and heart disease.
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
